# Patient Record
Sex: FEMALE | Race: WHITE | NOT HISPANIC OR LATINO | Employment: OTHER | URBAN - METROPOLITAN AREA
[De-identification: names, ages, dates, MRNs, and addresses within clinical notes are randomized per-mention and may not be internally consistent; named-entity substitution may affect disease eponyms.]

---

## 2017-01-11 ENCOUNTER — TRANSCRIBE ORDERS (OUTPATIENT)
Dept: LAB | Age: 52
End: 2017-01-11

## 2017-01-11 ENCOUNTER — APPOINTMENT (OUTPATIENT)
Dept: LAB | Age: 52
End: 2017-01-11
Payer: COMMERCIAL

## 2017-01-11 DIAGNOSIS — R00.2 PALPITATIONS: ICD-10-CM

## 2017-01-11 DIAGNOSIS — R73.9 HYPERGLYCEMIA: ICD-10-CM

## 2017-01-11 DIAGNOSIS — S06.0X9A CONCUSSION WITH LOSS OF CONSCIOUSNESS: ICD-10-CM

## 2017-01-11 DIAGNOSIS — G44.329 CHRONIC POST-TRAUMATIC HEADACHE, NOT INTRACTABLE: ICD-10-CM

## 2017-01-11 DIAGNOSIS — Z12.11 ENCOUNTER FOR SCREENING FOR MALIGNANT NEOPLASM OF COLON: ICD-10-CM

## 2017-01-11 DIAGNOSIS — F41.8 OTHER SPECIFIED ANXIETY DISORDERS: ICD-10-CM

## 2017-01-11 DIAGNOSIS — R26.2 DIFFICULTY IN WALKING, NOT ELSEWHERE CLASSIFIED: ICD-10-CM

## 2017-01-11 DIAGNOSIS — E78.5 HYPERLIPIDEMIA: ICD-10-CM

## 2017-01-11 DIAGNOSIS — R47.01 APHASIA: ICD-10-CM

## 2017-01-11 LAB
ALBUMIN SERPL BCP-MCNC: 4 G/DL (ref 3.5–5)
ALP SERPL-CCNC: 65 U/L (ref 46–116)
ALT SERPL W P-5'-P-CCNC: 34 U/L (ref 12–78)
ANION GAP SERPL CALCULATED.3IONS-SCNC: 7 MMOL/L (ref 4–13)
AST SERPL W P-5'-P-CCNC: 19 U/L (ref 5–45)
BACTERIA UR QL AUTO: NORMAL /HPF
BILIRUB SERPL-MCNC: 0.57 MG/DL (ref 0.2–1)
BILIRUB UR QL STRIP: NEGATIVE
BUN SERPL-MCNC: 11 MG/DL (ref 5–25)
CALCIUM SERPL-MCNC: 9.1 MG/DL (ref 8.3–10.1)
CHLORIDE SERPL-SCNC: 104 MMOL/L (ref 100–108)
CHOLEST SERPL-MCNC: 227 MG/DL (ref 50–200)
CLARITY UR: CLEAR
CO2 SERPL-SCNC: 28 MMOL/L (ref 21–32)
COLOR UR: YELLOW
CREAT SERPL-MCNC: 0.96 MG/DL (ref 0.6–1.3)
ERYTHROCYTE [DISTWIDTH] IN BLOOD BY AUTOMATED COUNT: 11.6 % (ref 11.6–15.1)
EST. AVERAGE GLUCOSE BLD GHB EST-MCNC: 100 MG/DL
GFR SERPL CREATININE-BSD FRML MDRD: >60 ML/MIN/1.73SQ M
GLUCOSE SERPL-MCNC: 105 MG/DL (ref 65–140)
GLUCOSE UR STRIP-MCNC: NEGATIVE MG/DL
HBA1C MFR BLD: 5.1 % (ref 4.2–6.3)
HCT VFR BLD AUTO: 43.9 % (ref 34.8–46.1)
HDLC SERPL-MCNC: 73 MG/DL (ref 40–60)
HGB BLD-MCNC: 14.7 G/DL (ref 11.5–15.4)
HGB UR QL STRIP.AUTO: ABNORMAL
KETONES UR STRIP-MCNC: NEGATIVE MG/DL
LDLC SERPL CALC-MCNC: 127 MG/DL (ref 0–100)
LEUKOCYTE ESTERASE UR QL STRIP: NEGATIVE
MCH RBC QN AUTO: 31.5 PG (ref 26.8–34.3)
MCHC RBC AUTO-ENTMCNC: 33.5 G/DL (ref 31.4–37.4)
MCV RBC AUTO: 94 FL (ref 82–98)
NITRITE UR QL STRIP: NEGATIVE
NON-SQ EPI CELLS URNS QL MICRO: NORMAL /HPF
PH UR STRIP.AUTO: 6 [PH] (ref 4.5–8)
PLATELET # BLD AUTO: 181 THOUSANDS/UL (ref 149–390)
PMV BLD AUTO: 10.7 FL (ref 8.9–12.7)
POTASSIUM SERPL-SCNC: 4.1 MMOL/L (ref 3.5–5.3)
PROT SERPL-MCNC: 7.8 G/DL (ref 6.4–8.2)
PROT UR STRIP-MCNC: NEGATIVE MG/DL
RBC # BLD AUTO: 4.66 MILLION/UL (ref 3.81–5.12)
RBC #/AREA URNS AUTO: NORMAL /HPF
SODIUM SERPL-SCNC: 139 MMOL/L (ref 136–145)
SP GR UR STRIP.AUTO: 1.01 (ref 1–1.03)
TRIGL SERPL-MCNC: 135 MG/DL
TSH SERPL DL<=0.05 MIU/L-ACNC: 1.07 UIU/ML (ref 0.36–3.74)
UROBILINOGEN UR QL STRIP.AUTO: 0.2 E.U./DL
WBC # BLD AUTO: 5.13 THOUSAND/UL (ref 4.31–10.16)
WBC #/AREA URNS AUTO: NORMAL /HPF

## 2017-01-11 PROCEDURE — 83036 HEMOGLOBIN GLYCOSYLATED A1C: CPT

## 2017-01-11 PROCEDURE — 84443 ASSAY THYROID STIM HORMONE: CPT

## 2017-01-11 PROCEDURE — 85027 COMPLETE CBC AUTOMATED: CPT

## 2017-01-11 PROCEDURE — 80053 COMPREHEN METABOLIC PANEL: CPT

## 2017-01-11 PROCEDURE — 80061 LIPID PANEL: CPT

## 2017-01-11 PROCEDURE — 81001 URINALYSIS AUTO W/SCOPE: CPT

## 2017-01-11 PROCEDURE — 36415 COLL VENOUS BLD VENIPUNCTURE: CPT

## 2017-01-17 ENCOUNTER — ALLSCRIPTS OFFICE VISIT (OUTPATIENT)
Dept: OTHER | Facility: OTHER | Age: 52
End: 2017-01-17

## 2017-05-31 ENCOUNTER — GENERIC CONVERSION - ENCOUNTER (OUTPATIENT)
Dept: OTHER | Facility: OTHER | Age: 52
End: 2017-05-31

## 2017-07-17 DIAGNOSIS — R26.2 DIFFICULTY IN WALKING, NOT ELSEWHERE CLASSIFIED: ICD-10-CM

## 2017-07-17 DIAGNOSIS — S06.0X9A CONCUSSION WITH LOSS OF CONSCIOUSNESS: ICD-10-CM

## 2017-07-17 DIAGNOSIS — R31.29 OTHER MICROSCOPIC HEMATURIA: ICD-10-CM

## 2017-07-17 DIAGNOSIS — E78.5 HYPERLIPIDEMIA: ICD-10-CM

## 2017-07-17 DIAGNOSIS — F41.8 OTHER SPECIFIED ANXIETY DISORDERS: ICD-10-CM

## 2017-07-17 DIAGNOSIS — G44.329 CHRONIC POST-TRAUMATIC HEADACHE, NOT INTRACTABLE: ICD-10-CM

## 2017-07-17 DIAGNOSIS — R73.9 HYPERGLYCEMIA: ICD-10-CM

## 2017-07-17 DIAGNOSIS — R47.01 APHASIA: ICD-10-CM

## 2017-07-17 DIAGNOSIS — Z12.11 ENCOUNTER FOR SCREENING FOR MALIGNANT NEOPLASM OF COLON: ICD-10-CM

## 2017-07-24 ENCOUNTER — APPOINTMENT (OUTPATIENT)
Dept: LAB | Facility: CLINIC | Age: 52
End: 2017-07-24
Payer: COMMERCIAL

## 2017-07-24 ENCOUNTER — TRANSCRIBE ORDERS (OUTPATIENT)
Dept: LAB | Facility: CLINIC | Age: 52
End: 2017-07-24

## 2017-07-24 DIAGNOSIS — R47.01 APHASIA: ICD-10-CM

## 2017-07-24 DIAGNOSIS — E78.5 HYPERLIPIDEMIA: ICD-10-CM

## 2017-07-24 DIAGNOSIS — R73.9 HYPERGLYCEMIA: ICD-10-CM

## 2017-07-24 DIAGNOSIS — R26.2 DIFFICULTY IN WALKING, NOT ELSEWHERE CLASSIFIED: ICD-10-CM

## 2017-07-24 DIAGNOSIS — Z12.11 ENCOUNTER FOR SCREENING FOR MALIGNANT NEOPLASM OF COLON: ICD-10-CM

## 2017-07-24 DIAGNOSIS — R31.29 OTHER MICROSCOPIC HEMATURIA: ICD-10-CM

## 2017-07-24 DIAGNOSIS — F41.8 OTHER SPECIFIED ANXIETY DISORDERS: ICD-10-CM

## 2017-07-24 DIAGNOSIS — S06.0X9A CONCUSSION WITH LOSS OF CONSCIOUSNESS: ICD-10-CM

## 2017-07-24 DIAGNOSIS — G44.329 CHRONIC POST-TRAUMATIC HEADACHE, NOT INTRACTABLE: ICD-10-CM

## 2017-07-24 LAB
ALBUMIN SERPL BCP-MCNC: 4 G/DL (ref 3.5–5)
ALP SERPL-CCNC: 65 U/L (ref 46–116)
ALT SERPL W P-5'-P-CCNC: 39 U/L (ref 12–78)
ANION GAP SERPL CALCULATED.3IONS-SCNC: 6 MMOL/L (ref 4–13)
AST SERPL W P-5'-P-CCNC: 23 U/L (ref 5–45)
BACTERIA UR QL AUTO: ABNORMAL /HPF
BILIRUB SERPL-MCNC: 0.53 MG/DL (ref 0.2–1)
BILIRUB UR QL STRIP: NEGATIVE
BUN SERPL-MCNC: 13 MG/DL (ref 5–25)
CALCIUM SERPL-MCNC: 9.2 MG/DL (ref 8.3–10.1)
CHLORIDE SERPL-SCNC: 104 MMOL/L (ref 100–108)
CHOLEST SERPL-MCNC: 232 MG/DL (ref 50–200)
CLARITY UR: CLEAR
CO2 SERPL-SCNC: 27 MMOL/L (ref 21–32)
COLOR UR: YELLOW
CREAT SERPL-MCNC: 0.95 MG/DL (ref 0.6–1.3)
ERYTHROCYTE [DISTWIDTH] IN BLOOD BY AUTOMATED COUNT: 11.7 % (ref 11.6–15.1)
EST. AVERAGE GLUCOSE BLD GHB EST-MCNC: 108 MG/DL
GFR SERPL CREATININE-BSD FRML MDRD: 69 ML/MIN/1.73SQ M
GLUCOSE P FAST SERPL-MCNC: 97 MG/DL (ref 65–99)
GLUCOSE UR STRIP-MCNC: NEGATIVE MG/DL
HBA1C MFR BLD: 5.4 % (ref 4.2–6.3)
HCT VFR BLD AUTO: 41.3 % (ref 34.8–46.1)
HDLC SERPL-MCNC: 59 MG/DL (ref 40–60)
HGB BLD-MCNC: 14 G/DL (ref 11.5–15.4)
HGB UR QL STRIP.AUTO: NEGATIVE
HYALINE CASTS #/AREA URNS LPF: ABNORMAL /LPF
KETONES UR STRIP-MCNC: NEGATIVE MG/DL
LDLC SERPL CALC-MCNC: 146 MG/DL (ref 0–100)
LEUKOCYTE ESTERASE UR QL STRIP: NEGATIVE
MCH RBC QN AUTO: 31.3 PG (ref 26.8–34.3)
MCHC RBC AUTO-ENTMCNC: 33.9 G/DL (ref 31.4–37.4)
MCV RBC AUTO: 92 FL (ref 82–98)
NITRITE UR QL STRIP: POSITIVE
NON-SQ EPI CELLS URNS QL MICRO: ABNORMAL /HPF
PH UR STRIP.AUTO: 7 [PH] (ref 4.5–8)
PLATELET # BLD AUTO: 146 THOUSANDS/UL (ref 149–390)
PMV BLD AUTO: 11.1 FL (ref 8.9–12.7)
POTASSIUM SERPL-SCNC: 3.8 MMOL/L (ref 3.5–5.3)
PROT SERPL-MCNC: 7.7 G/DL (ref 6.4–8.2)
PROT UR STRIP-MCNC: NEGATIVE MG/DL
RBC # BLD AUTO: 4.47 MILLION/UL (ref 3.81–5.12)
RBC #/AREA URNS AUTO: ABNORMAL /HPF
SODIUM SERPL-SCNC: 137 MMOL/L (ref 136–145)
SP GR UR STRIP.AUTO: 1.01 (ref 1–1.03)
TRIGL SERPL-MCNC: 135 MG/DL
TSH SERPL DL<=0.05 MIU/L-ACNC: 1.37 UIU/ML (ref 0.36–3.74)
UROBILINOGEN UR QL STRIP.AUTO: 0.2 E.U./DL
WBC # BLD AUTO: 5.54 THOUSAND/UL (ref 4.31–10.16)
WBC #/AREA URNS AUTO: ABNORMAL /HPF

## 2017-07-24 PROCEDURE — 80061 LIPID PANEL: CPT

## 2017-07-24 PROCEDURE — 81001 URINALYSIS AUTO W/SCOPE: CPT

## 2017-07-24 PROCEDURE — 36415 COLL VENOUS BLD VENIPUNCTURE: CPT

## 2017-07-24 PROCEDURE — 84443 ASSAY THYROID STIM HORMONE: CPT

## 2017-07-24 PROCEDURE — 85027 COMPLETE CBC AUTOMATED: CPT

## 2017-07-24 PROCEDURE — 80053 COMPREHEN METABOLIC PANEL: CPT

## 2017-07-24 PROCEDURE — 83036 HEMOGLOBIN GLYCOSYLATED A1C: CPT

## 2017-07-27 ENCOUNTER — ALLSCRIPTS OFFICE VISIT (OUTPATIENT)
Dept: OTHER | Facility: OTHER | Age: 52
End: 2017-07-27

## 2017-08-21 DIAGNOSIS — N39.0 URINARY TRACT INFECTION: ICD-10-CM

## 2018-01-11 NOTE — RESULT NOTES
Verified Results  (1) TISSUE EXAM [id number] 11:35AM Edy Clements     Test Name Result Flag Reference   LAB AP CASE REPORT (Report)     Surgical Pathology Report             Case: [id number]                   Authorizing Provider: Crystal Barr MD      Collected:      08/17/2016 1135        Ordering Location:   [facility]    Received:      08/18/2016 Rubén Arreguin U  8  Endoscopy                            Pathologist:      Bradley Russ MD                             Specimen:  Polyp, Colorectal, ascending colon polyp cold biopsy   LAB AP FINAL DIAGNOSIS      A  Ascending colon polyp:    - Colonic mucosa with a benign lymphoid aggregate, negative for   dysplasia and malignancy  Electronically signed by Bradley Russ MD on 8/20/2016 at 11:25 AM   LAB AP NOTE      Interpretation performed at [facility], [place], [facility]   LAB AP SURGICAL ADDITIONAL INFORMATION (Report)     These tests were developed and their performance characteristics   determined by [facility] or [facility]  They may not be cleared or approved by the [place]  Food and   Drug Administration  The FDA has determined that such clearance or   approval is not necessary  These tests are used for clinical purposes  They should not be regarded as investigational or for research  This   laboratory has been approved by [place], designated as a high-complexity   laboratory and is qualified to perform these tests  LAB AP GROSS DESCRIPTION (Report)     A  The specimen is received in formalin, labeled with the patient's name   and hospital number, and is designated ascending colon polyp  The   specimen consists of 3 tan soft tissue fragments measuring 0 2, 0 3 and   0 4 cm  Entirely submitted  One cassette      Note: The estimated total formalin fixation time based upon information   provided by the submitting clinician and the standard processing schedule   is 41 0 hours      MAC   LAB AP CLINICAL INFORMATION      Ascending colon polyp cold biopsy   Ascending colon polyp cold biopsy

## 2018-01-12 NOTE — RESULT NOTES
Verified Results  (1) LYME ANTIBODY, WESTERN BLOT 04Apr2016 10:02AM Hina Mcnamara Order Number: [id number]    Performed at:  [address] [place] [address]  [id number]  : Mary Carmen Roberson MD, Phone:  [phone]     Test Name Result Flag Reference   LYME 18 KD IGG Present A    LYME 23 KD IGG Present A    LYME 28 KD IGG Absent     LYME 30 KD IGG Present A    LYME 39 KD IGG Present A    LYME 41 KD IGG Absent     LYME 45 KD IGG Absent     LYME 58 KD IGG Present A    LYME 66 KD IGG Absent     LYME 93 KD IGG Absent     LYME 23 KD IGM Present A    LYME 39 KD IGM Absent     LYME 41 KD IGM Absent     LYME IGG WB INTERP  Positive A    Positive: 5 of the following                               Borrelia-specific bands:                               18,23,28,30,39,41,45,58,                               66, and 93  Negative: No bands or banding                               patterns which do not                               meet positive criteria  LYME IGM WB INTERP  Negative     Note: An equivocal or positive EIA result followed by a negativeWestern Blot result is considered NEGATIVE  An equivocal or positiveEIA result followed by a positive Western Blot is considered POSITIVEby the CDC  Positive: 2 of the following bands: 23,39 or 41Negative: No bands or banding patterns which do not meet positivecriteria  Criteria for positivity are those recommended by CDC/ASTPHLD p23=Osp C, c74=kdqacutbsFixu:Sera from individuals with the following may cross react in theLyme Western Blot assays: other spirochetal diseases (periodontaldisease, leptospirosis, relapsing fever, yaws, and pinta);connective autoimmune (Rheumatoid Arthritis and Systemic LupusErythematosus and also individuals with Antinuclear Antibody);other infections COFFEE [facility] Spotted Fever; Brian-Barr Virus,and Cytomegalovirus)

## 2018-01-13 VITALS
DIASTOLIC BLOOD PRESSURE: 78 MMHG | BODY MASS INDEX: 30.44 KG/M2 | RESPIRATION RATE: 16 BRPM | HEART RATE: 60 BPM | WEIGHT: 189.38 LBS | HEIGHT: 66 IN | SYSTOLIC BLOOD PRESSURE: 126 MMHG

## 2018-01-13 VITALS
WEIGHT: 190 LBS | SYSTOLIC BLOOD PRESSURE: 130 MMHG | DIASTOLIC BLOOD PRESSURE: 76 MMHG | BODY MASS INDEX: 30.67 KG/M2 | HEART RATE: 64 BPM

## 2018-01-16 NOTE — RESULT NOTES
Verified Results  (1) LYME PCR 12Apr2016 02:35PM Flako Pavon   Performed at:  [address], [address] [facility]  : Helen Cordon MD, Phone:  [phone]     Test Name Result Flag Reference   LYME PCR SP RES Negative  Negative   No B  burgdorferi DNA Detected  A negative PCR result for Borrelia burgdorferi on a blood sample doesnot eliminate the possibility of Lyme disease  CDC recommends thattwo-tiered serological testing in conjunction with clinical evaluationbe used as the primary method of diagnosis  This test was developed and its performance characteristics determinedby [facility]   It has not been cleared or approved by the Food and DrugAdministration  The FDA has determined that such clearance orapproval is not necessary

## 2018-01-27 DIAGNOSIS — Z00.00 ENCOUNTER FOR GENERAL ADULT MEDICAL EXAMINATION WITHOUT ABNORMAL FINDINGS: ICD-10-CM

## 2018-01-27 DIAGNOSIS — S06.0X9A CONCUSSION WITH LOSS OF CONSCIOUSNESS: ICD-10-CM

## 2018-01-27 DIAGNOSIS — E78.5 HYPERLIPIDEMIA: ICD-10-CM

## 2018-01-27 DIAGNOSIS — Z12.11 ENCOUNTER FOR SCREENING FOR MALIGNANT NEOPLASM OF COLON: ICD-10-CM

## 2018-01-27 DIAGNOSIS — R26.2 DIFFICULTY IN WALKING, NOT ELSEWHERE CLASSIFIED: ICD-10-CM

## 2018-01-27 DIAGNOSIS — F41.8 OTHER SPECIFIED ANXIETY DISORDERS: ICD-10-CM

## 2018-01-27 DIAGNOSIS — R73.9 HYPERGLYCEMIA: ICD-10-CM

## 2018-01-27 DIAGNOSIS — G81.91 HEMIPLEGIA AFFECTING RIGHT DOMINANT SIDE (HCC): ICD-10-CM

## 2018-01-27 DIAGNOSIS — R47.01 APHASIA: ICD-10-CM

## 2018-01-27 DIAGNOSIS — G44.329 CHRONIC POST-TRAUMATIC HEADACHE, NOT INTRACTABLE: ICD-10-CM

## 2018-01-27 DIAGNOSIS — N39.0 URINARY TRACT INFECTION: ICD-10-CM

## 2018-02-12 ENCOUNTER — OFFICE VISIT (OUTPATIENT)
Dept: FAMILY MEDICINE CLINIC | Facility: CLINIC | Age: 53
End: 2018-02-12
Payer: COMMERCIAL

## 2018-02-12 VITALS
DIASTOLIC BLOOD PRESSURE: 72 MMHG | HEIGHT: 65 IN | SYSTOLIC BLOOD PRESSURE: 108 MMHG | BODY MASS INDEX: 30.86 KG/M2 | WEIGHT: 185.2 LBS | TEMPERATURE: 98.6 F | HEART RATE: 72 BPM

## 2018-02-12 DIAGNOSIS — J40 BRONCHITIS: Primary | ICD-10-CM

## 2018-02-12 PROCEDURE — 99214 OFFICE O/P EST MOD 30 MIN: CPT | Performed by: PHYSICIAN ASSISTANT

## 2018-02-12 RX ORDER — DOXYCYCLINE 100 MG/1
100 TABLET ORAL 2 TIMES DAILY
Qty: 20 TABLET | Refills: 0 | Status: SHIPPED | OUTPATIENT
Start: 2018-02-12 | End: 2018-02-22

## 2018-02-12 RX ORDER — QUETIAPINE FUMARATE 50 MG/1
50 TABLET, FILM COATED ORAL DAILY PRN
COMMUNITY
Start: 2017-07-27

## 2018-02-12 NOTE — PROGRESS NOTES
Assessment/Plan:    Bronchitis  Antibiotic as directed  Increase fluids  Mucinex or Delsym as directed  Diagnoses and all orders for this visit:    Bronchitis  -     doxycycline (ADOXA) 100 MG tablet; Take 1 tablet (100 mg total) by mouth 2 (two) times a day for 10 days    Other orders  -     QUEtiapine (SEROQUEL) 50 mg tablet; Take by mouth          Subjective:   Cc: Pt c/o chest congestion, cough, sinus pressure and B/L ears blocked and feel as though there is water in the ear,fatigue, chills and body aches x 10 days also she states with this current illness her heart has been racing  kw     Patient ID: Alexis Pickett is a 47 y o  female  URI    This is a new problem  The current episode started 1 to 4 weeks ago  The problem has been gradually worsening  There has been no fever  Associated symptoms include congestion, coughing, rhinorrhea, sinus pain, a sore throat and wheezing  Treatments tried: Mucinex and Delsym  The treatment provided no relief  The following portions of the patient's history were reviewed and updated as appropriate: allergies, current medications, past family history, past medical history, past social history, past surgical history and problem list     Review of Systems   Constitutional: Positive for fatigue  HENT: Positive for congestion, nosebleeds, postnasal drip, rhinorrhea, sinus pain and sore throat  Eyes: Negative  Respiratory: Positive for cough and wheezing  Cardiovascular: Negative  Gastrointestinal: Negative  Endocrine: Negative  Genitourinary: Negative  Musculoskeletal: Negative  Skin: Negative  Allergic/Immunologic: Negative  Neurological: Negative  Hematological: Negative  Psychiatric/Behavioral: Negative            Objective:  Vitals:    02/12/18 1413   BP: 108/72   BP Location: Left arm   Pulse: 72   Temp: 98 6 °F (37 °C)   Weight: 84 kg (185 lb 3 2 oz)   Height: 5' 5" (1 651 m)       Vitals:    02/12/18 1413   BP: 108/72 Pulse: 72   Temp: 98 6 °F (37 °C)        Physical Exam   Constitutional: She is oriented to person, place, and time  She appears well-developed and well-nourished  No distress  HENT:   Head: Normocephalic and atraumatic  Eyes: Conjunctivae are normal  Right eye exhibits no discharge  Left eye exhibits no discharge  Neck: Neck supple  Carotid bruit is not present  Cardiovascular: Normal rate, regular rhythm and normal heart sounds  Exam reveals no gallop and no friction rub  No murmur heard  Pulmonary/Chest: Effort normal  No respiratory distress  She has wheezes  She has no rales  Mild rhonchi right posterior lung fields  Neurological: She is alert and oriented to person, place, and time  Skin: Skin is warm and dry  She is not diaphoretic  Psychiatric: She has a normal mood and affect  Judgment normal    Nursing note and vitals reviewed

## 2018-02-12 NOTE — PATIENT INSTRUCTIONS
Problem List Items Addressed This Visit     Bronchitis - Primary     Antibiotic as directed  Increase fluids  Mucinex or Delsym as directed            Relevant Medications    doxycycline (ADOXA) 100 MG tablet

## 2018-03-02 LAB
ALBUMIN SERPL-MCNC: 4.4 G/DL (ref 3.6–5.1)
ALBUMIN/GLOB SERPL: 1.8 (CALC) (ref 1–2.5)
ALP SERPL-CCNC: 88 U/L (ref 33–130)
ALT SERPL-CCNC: 24 U/L (ref 6–29)
APPEARANCE UR: CLEAR
AST SERPL-CCNC: 22 U/L (ref 10–35)
BACTERIA UR QL AUTO: ABNORMAL /HPF
BILIRUB SERPL-MCNC: 0.5 MG/DL (ref 0.2–1.2)
BILIRUB UR QL STRIP: NEGATIVE
BUN SERPL-MCNC: 15 MG/DL (ref 7–25)
BUN/CREAT SERPL: NORMAL (CALC) (ref 6–22)
CALCIUM SERPL-MCNC: 9.5 MG/DL (ref 8.6–10.4)
CHLORIDE SERPL-SCNC: 104 MMOL/L (ref 98–110)
CHOLEST SERPL-MCNC: 176 MG/DL
CHOLEST/HDLC SERPL: 2.8 (CALC)
CO2 SERPL-SCNC: 28 MMOL/L (ref 20–31)
COLOR UR: YELLOW
CREAT SERPL-MCNC: 0.9 MG/DL (ref 0.5–1.05)
ERYTHROCYTE [DISTWIDTH] IN BLOOD BY AUTOMATED COUNT: 12 % (ref 11–15)
GLOBULIN SER CALC-MCNC: 2.5 G/DL (CALC) (ref 1.9–3.7)
GLUCOSE SERPL-MCNC: 91 MG/DL (ref 65–99)
GLUCOSE UR QL STRIP: NEGATIVE
HBA1C MFR BLD: 5.3 % OF TOTAL HGB
HCT VFR BLD AUTO: 39.7 % (ref 35–45)
HDLC SERPL-MCNC: 62 MG/DL
HGB BLD-MCNC: 13.3 G/DL (ref 11.7–15.5)
HGB UR QL STRIP: ABNORMAL
HYALINE CASTS #/AREA URNS LPF: ABNORMAL /LPF
KETONES UR QL STRIP: NEGATIVE
LDLC SERPL CALC-MCNC: 91 MG/DL (CALC)
LEUKOCYTE ESTERASE UR QL STRIP: NEGATIVE
MCH RBC QN AUTO: 30.9 PG (ref 27–33)
MCHC RBC AUTO-ENTMCNC: 33.5 G/DL (ref 32–36)
MCV RBC AUTO: 92.3 FL (ref 80–100)
NITRITE UR QL STRIP: NEGATIVE
NONHDLC SERPL-MCNC: 114 MG/DL (CALC)
PH UR STRIP: ABNORMAL [PH] (ref 5–8)
PLATELET # BLD AUTO: 162 THOUSAND/UL (ref 140–400)
PMV BLD REES-ECKER: 10.6 FL (ref 7.5–12.5)
POTASSIUM SERPL-SCNC: 4.5 MMOL/L (ref 3.5–5.3)
PROT SERPL-MCNC: 6.9 G/DL (ref 6.1–8.1)
PROT UR QL STRIP: NEGATIVE
RBC # BLD AUTO: 4.3 MILLION/UL (ref 3.8–5.1)
RBC #/AREA URNS HPF: ABNORMAL /HPF
SL AMB EGFR AFRICAN AMERICAN: 85 ML/MIN/1.73M2
SL AMB EGFR NON AFRICAN AMERICAN: 73 ML/MIN/1.73M2
SODIUM SERPL-SCNC: 140 MMOL/L (ref 135–146)
SP GR UR STRIP: 1.02 (ref 1–1.03)
SQUAMOUS #/AREA URNS HPF: ABNORMAL /HPF
TRIGL SERPL-MCNC: 126 MG/DL
TSH SERPL-ACNC: 1.09 MIU/L
WBC # BLD AUTO: 4.8 THOUSAND/UL (ref 3.8–10.8)
WBC #/AREA URNS HPF: ABNORMAL /HPF

## 2018-03-03 PROBLEM — N39.3 STRESS INCONTINENCE IN FEMALE: Status: ACTIVE | Noted: 2017-09-14

## 2018-03-13 ENCOUNTER — OFFICE VISIT (OUTPATIENT)
Dept: FAMILY MEDICINE CLINIC | Facility: CLINIC | Age: 53
End: 2018-03-13
Payer: MEDICARE

## 2018-03-13 VITALS
BODY MASS INDEX: 31.65 KG/M2 | SYSTOLIC BLOOD PRESSURE: 124 MMHG | HEIGHT: 65 IN | WEIGHT: 190 LBS | RESPIRATION RATE: 16 BRPM | HEART RATE: 64 BPM | DIASTOLIC BLOOD PRESSURE: 70 MMHG

## 2018-03-13 DIAGNOSIS — R31.29 HEMATURIA, MICROSCOPIC: ICD-10-CM

## 2018-03-13 DIAGNOSIS — F07.81 POST CONCUSSION SYNDROME: ICD-10-CM

## 2018-03-13 DIAGNOSIS — E78.5 HYPERLIPIDEMIA, UNSPECIFIED HYPERLIPIDEMIA TYPE: ICD-10-CM

## 2018-03-13 DIAGNOSIS — R73.9 HYPERGLYCEMIA: ICD-10-CM

## 2018-03-13 DIAGNOSIS — S06.9X9S LATE EFFECT OF INTRACRANIAL INJURY (HCC): ICD-10-CM

## 2018-03-13 DIAGNOSIS — S29.019A THORACIC MYOFASCIAL STRAIN, INITIAL ENCOUNTER: Primary | ICD-10-CM

## 2018-03-13 DIAGNOSIS — F41.8 DEPRESSION WITH ANXIETY: ICD-10-CM

## 2018-03-13 DIAGNOSIS — G44.329 CHRONIC POST-TRAUMATIC HEADACHE, NOT INTRACTABLE: ICD-10-CM

## 2018-03-13 DIAGNOSIS — R26.2 AMBULATORY DYSFUNCTION: ICD-10-CM

## 2018-03-13 PROBLEM — J40 BRONCHITIS: Status: RESOLVED | Noted: 2018-02-12 | Resolved: 2018-03-13

## 2018-03-13 PROCEDURE — 99214 OFFICE O/P EST MOD 30 MIN: CPT | Performed by: FAMILY MEDICINE

## 2018-03-13 NOTE — PROGRESS NOTES
Assessment/Plan:  1  Thoracic strain, patient use ice and Tylenol if not fully relieved in 1-2 weeks return to office if worsen call office earlier  2  Hyperlipidemia, stable continue present therapy  3  Hyperglycemia diet controlled  4  Traumatic brain injury/post concussive syndrome/ambulation dysfunction at the present time patient is stable patient follows up with Neurology  5  Hematuria, microscopic this was evaluated Urology in the past   Benign will continue to monitor  6  Anxiety/depression, stable patient follows up with Psychiatry  7  Patient to return in 6 months for opposite blood work will see sooner if needed  Hematuria, microscopic  Chronic and benign was evaluated by Urology ultrasound in past was normal    Hyperlipidemia  Stable continue present therapy    Hyperglycemia  Diet controlled    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[facility])  Patient follows with neurologist    Depression with anxiety  Stable follow-up with Psychiatry    Chronic post-traumatic headache  Patient follows up with Neurology    Ambulatory dysfunction  Stable using cane    Post concussion syndrome  Follows with Neurology       Diagnoses and all orders for this visit:    Thoracic myofascial strain, initial encounter    Hematuria, microscopic  -     CBC; Future  -     Comprehensive metabolic panel; Future  -     Lipid Panel with Direct LDL reflex; Future  -     TSH, 3rd generation with T4 reflex; Future  -     Urinalysis with microscopic; Future  -     HEMOGLOBIN A1C W/ EAG ESTIMATION; Future    Hyperlipidemia, unspecified hyperlipidemia type  -     CBC; Future  -     Comprehensive metabolic panel; Future  -     Lipid Panel with Direct LDL reflex; Future  -     TSH, 3rd generation with T4 reflex; Future  -     Urinalysis with microscopic; Future  -     HEMOGLOBIN A1C W/ EAG ESTIMATION; Future    Hyperglycemia  -     CBC; Future  -     Comprehensive metabolic panel; Future  -     Lipid Panel with Direct LDL reflex;  Future  - TSH, 3rd generation with T4 reflex; Future  -     Urinalysis with microscopic; Future  -     HEMOGLOBIN A1C W/ EAG ESTIMATION; Future    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[address] )  -     CBC; Future  -     Comprehensive metabolic panel; Future  -     Lipid Panel with Direct LDL reflex; Future  -     TSH, 3rd generation with T4 reflex; Future  -     Urinalysis with microscopic; Future  -     HEMOGLOBIN A1C W/ EAG ESTIMATION; Future    Depression with anxiety  -     CBC; Future  -     Comprehensive metabolic panel; Future  -     Lipid Panel with Direct LDL reflex; Future  -     TSH, 3rd generation with T4 reflex; Future  -     Urinalysis with microscopic; Future  -     HEMOGLOBIN A1C W/ EAG ESTIMATION; Future    Chronic post-traumatic headache, not intractable  -     CBC; Future  -     Comprehensive metabolic panel; Future  -     Lipid Panel with Direct LDL reflex; Future  -     TSH, 3rd generation with T4 reflex; Future  -     Urinalysis with microscopic; Future  -     HEMOGLOBIN A1C W/ EAG ESTIMATION; Future    Ambulatory dysfunction  -     CBC; Future  -     Comprehensive metabolic panel; Future  -     Lipid Panel with Direct LDL reflex; Future  -     TSH, 3rd generation with T4 reflex; Future  -     Urinalysis with microscopic; Future  -     HEMOGLOBIN A1C W/ EAG ESTIMATION; Future    Post concussion syndrome  -     CBC; Future  -     Comprehensive metabolic panel; Future  -     Lipid Panel with Direct LDL reflex; Future  -     TSH, 3rd generation with T4 reflex; Future  -     Urinalysis with microscopic; Future  -     HEMOGLOBIN A1C W/ EAG ESTIMATION; Future          Subjective:   Cc: Follows up and to review blood work results  JUAN CARLOS Voss     Patient ID: Rosalinda Salvador is a 47 y o  female  Patient doing well  Patient had some thoracic pain status post a URI/bronchitis with severe coughing  Left side is completely resolved right side is almost fully resolved    At the present time no chest pain shortness of breath wheeze hemoptysis doing well  Patient does follow up with specialist and Neurology for her post concussive symptoms and psychiatry  The following portions of the patient's history were reviewed and updated as appropriate: allergies, current medications, past family history, past medical history, past social history, past surgical history and problem list     Review of Systems   Constitutional: Negative  HENT: Negative  Eyes: Negative  Respiratory: Negative  Cardiovascular: Negative  Gastrointestinal: Negative  Endocrine: Negative  Musculoskeletal:        HPI   Allergic/Immunologic: Negative  Neurological:        HPI   Hematological: Negative  Psychiatric/Behavioral:        HPI         Objective:      Vitals:    03/13/18 1117   BP: 124/70   BP Location: Left arm   Patient Position: Sitting   Cuff Size: Standard   Pulse: 64   Resp: 16   Weight: 86 2 kg (190 lb)   Height: 5' 5" (1 651 m)          Physical Exam   Constitutional: She is oriented to person, place, and time  She appears well-developed and well-nourished  HENT:   Head: Normocephalic and atraumatic  Mouth/Throat: Oropharynx is clear and moist    Eyes: Conjunctivae are normal    Neck: Neck supple  Cardiovascular: Regular rhythm and normal heart sounds  Pulmonary/Chest: Breath sounds normal    Abdominal: Soft  Bowel sounds are normal    Musculoskeletal:   Ambulating with cane, mild paravertebral muscle contraction thoracic right more so than left area negative gross deformity negative point tenderness   Lymphadenopathy:     She has no cervical adenopathy  Neurological: She is alert and oriented to person, place, and time  Skin: Skin is warm and dry  Psychiatric: She has a normal mood and affect

## 2018-09-19 DIAGNOSIS — E78.5 HYPERLIPIDEMIA, UNSPECIFIED HYPERLIPIDEMIA TYPE: Primary | ICD-10-CM

## 2018-09-19 RX ORDER — PRAVASTATIN SODIUM 40 MG
TABLET ORAL
Qty: 90 TABLET | Refills: 0 | Status: SHIPPED | OUTPATIENT
Start: 2018-09-19 | End: 2018-09-21 | Stop reason: SDUPTHER

## 2018-09-21 DIAGNOSIS — E78.5 HYPERLIPIDEMIA, UNSPECIFIED HYPERLIPIDEMIA TYPE: ICD-10-CM

## 2018-09-21 RX ORDER — PRAVASTATIN SODIUM 80 MG/1
80 TABLET ORAL DAILY
Qty: 90 TABLET | Refills: 0 | Status: SHIPPED | OUTPATIENT
Start: 2018-09-21 | End: 2019-02-18 | Stop reason: SDUPTHER

## 2018-10-08 ENCOUNTER — APPOINTMENT (OUTPATIENT)
Dept: LAB | Facility: MEDICAL CENTER | Age: 53
End: 2018-10-08
Payer: MEDICARE

## 2018-10-08 DIAGNOSIS — F07.81 POST CONCUSSION SYNDROME: ICD-10-CM

## 2018-10-08 DIAGNOSIS — G44.329 CHRONIC POST-TRAUMATIC HEADACHE, NOT INTRACTABLE: ICD-10-CM

## 2018-10-08 DIAGNOSIS — E78.5 HYPERLIPIDEMIA, UNSPECIFIED HYPERLIPIDEMIA TYPE: ICD-10-CM

## 2018-10-08 DIAGNOSIS — R26.2 AMBULATORY DYSFUNCTION: ICD-10-CM

## 2018-10-08 DIAGNOSIS — R73.9 HYPERGLYCEMIA: ICD-10-CM

## 2018-10-08 DIAGNOSIS — S06.9X9S LATE EFFECT OF INTRACRANIAL INJURY (HCC): ICD-10-CM

## 2018-10-08 DIAGNOSIS — R31.29 HEMATURIA, MICROSCOPIC: ICD-10-CM

## 2018-10-08 DIAGNOSIS — F41.8 DEPRESSION WITH ANXIETY: ICD-10-CM

## 2018-10-08 LAB
ALBUMIN SERPL BCP-MCNC: 3.5 G/DL (ref 3.5–5)
ALP SERPL-CCNC: 67 U/L (ref 46–116)
ALT SERPL W P-5'-P-CCNC: 77 U/L (ref 12–78)
ANION GAP SERPL CALCULATED.3IONS-SCNC: 7 MMOL/L (ref 4–13)
AST SERPL W P-5'-P-CCNC: 42 U/L (ref 5–45)
BACTERIA UR QL AUTO: ABNORMAL /HPF
BILIRUB SERPL-MCNC: 0.73 MG/DL (ref 0.2–1)
BILIRUB UR QL STRIP: NEGATIVE
BUN SERPL-MCNC: 17 MG/DL (ref 5–25)
CALCIUM SERPL-MCNC: 9 MG/DL (ref 8.3–10.1)
CHLORIDE SERPL-SCNC: 106 MMOL/L (ref 100–108)
CHOLEST SERPL-MCNC: 169 MG/DL (ref 50–200)
CLARITY UR: CLEAR
CO2 SERPL-SCNC: 24 MMOL/L (ref 21–32)
COLOR UR: YELLOW
CREAT SERPL-MCNC: 0.67 MG/DL (ref 0.6–1.3)
ERYTHROCYTE [DISTWIDTH] IN BLOOD BY AUTOMATED COUNT: 10.8 % (ref 11.6–15.1)
EST. AVERAGE GLUCOSE BLD GHB EST-MCNC: 108 MG/DL
GFR SERPL CREATININE-BSD FRML MDRD: 100 ML/MIN/1.73SQ M
GLUCOSE P FAST SERPL-MCNC: 96 MG/DL (ref 65–99)
GLUCOSE UR STRIP-MCNC: NEGATIVE MG/DL
HBA1C MFR BLD: 5.4 % (ref 4.2–6.3)
HCT VFR BLD AUTO: 40.7 % (ref 34.8–46.1)
HDLC SERPL-MCNC: 52 MG/DL (ref 40–60)
HGB BLD-MCNC: 13.5 G/DL (ref 11.5–15.4)
HGB UR QL STRIP.AUTO: ABNORMAL
HYALINE CASTS #/AREA URNS LPF: ABNORMAL /LPF
KETONES UR STRIP-MCNC: NEGATIVE MG/DL
LDLC SERPL CALC-MCNC: 83 MG/DL (ref 0–100)
LEUKOCYTE ESTERASE UR QL STRIP: NEGATIVE
MCH RBC QN AUTO: 30.9 PG (ref 26.8–34.3)
MCHC RBC AUTO-ENTMCNC: 33.2 G/DL (ref 31.4–37.4)
MCV RBC AUTO: 93 FL (ref 82–98)
NITRITE UR QL STRIP: NEGATIVE
NON-SQ EPI CELLS URNS QL MICRO: ABNORMAL /HPF
PH UR STRIP.AUTO: 6 [PH] (ref 4.5–8)
PLATELET # BLD AUTO: 155 THOUSANDS/UL (ref 149–390)
PMV BLD AUTO: 10.9 FL (ref 8.9–12.7)
POTASSIUM SERPL-SCNC: 3.8 MMOL/L (ref 3.5–5.3)
PROT SERPL-MCNC: 7.1 G/DL (ref 6.4–8.2)
PROT UR STRIP-MCNC: NEGATIVE MG/DL
RBC # BLD AUTO: 4.37 MILLION/UL (ref 3.81–5.12)
RBC #/AREA URNS AUTO: ABNORMAL /HPF
SODIUM SERPL-SCNC: 137 MMOL/L (ref 136–145)
SP GR UR STRIP.AUTO: 1.02 (ref 1–1.03)
T4 FREE SERPL-MCNC: 1.74 NG/DL (ref 0.76–1.46)
TRIGL SERPL-MCNC: 170 MG/DL
TSH SERPL DL<=0.05 MIU/L-ACNC: <0.007 UIU/ML (ref 0.36–3.74)
UROBILINOGEN UR QL STRIP.AUTO: 0.2 E.U./DL
WBC # BLD AUTO: 4.07 THOUSAND/UL (ref 4.31–10.16)
WBC #/AREA URNS AUTO: ABNORMAL /HPF

## 2018-10-08 PROCEDURE — 36415 COLL VENOUS BLD VENIPUNCTURE: CPT

## 2018-10-08 PROCEDURE — 84443 ASSAY THYROID STIM HORMONE: CPT

## 2018-10-08 PROCEDURE — 81001 URINALYSIS AUTO W/SCOPE: CPT

## 2018-10-08 PROCEDURE — 85027 COMPLETE CBC AUTOMATED: CPT

## 2018-10-08 PROCEDURE — 80061 LIPID PANEL: CPT

## 2018-10-08 PROCEDURE — 84439 ASSAY OF FREE THYROXINE: CPT

## 2018-10-08 PROCEDURE — 83036 HEMOGLOBIN GLYCOSYLATED A1C: CPT

## 2018-10-08 PROCEDURE — 80053 COMPREHEN METABOLIC PANEL: CPT

## 2018-10-17 RX ORDER — TEMAZEPAM 7.5 MG/1
CAPSULE ORAL
Refills: 0 | COMMUNITY
Start: 2018-07-26 | End: 2018-12-05 | Stop reason: ALTCHOICE

## 2018-10-18 ENCOUNTER — APPOINTMENT (OUTPATIENT)
Dept: LAB | Facility: CLINIC | Age: 53
End: 2018-10-18
Payer: MEDICARE

## 2018-10-18 ENCOUNTER — OFFICE VISIT (OUTPATIENT)
Dept: FAMILY MEDICINE CLINIC | Facility: CLINIC | Age: 53
End: 2018-10-18
Payer: MEDICARE

## 2018-10-18 VITALS
DIASTOLIC BLOOD PRESSURE: 80 MMHG | HEART RATE: 64 BPM | WEIGHT: 184.2 LBS | BODY MASS INDEX: 30.69 KG/M2 | SYSTOLIC BLOOD PRESSURE: 122 MMHG | HEIGHT: 65 IN

## 2018-10-18 DIAGNOSIS — E78.5 HYPERLIPIDEMIA, UNSPECIFIED HYPERLIPIDEMIA TYPE: ICD-10-CM

## 2018-10-18 DIAGNOSIS — Z00.00 HEALTH CARE MAINTENANCE: ICD-10-CM

## 2018-10-18 DIAGNOSIS — F41.8 DEPRESSION WITH ANXIETY: ICD-10-CM

## 2018-10-18 DIAGNOSIS — F07.81 POST CONCUSSION SYNDROME: ICD-10-CM

## 2018-10-18 DIAGNOSIS — E05.90 HYPERTHYROIDISM: Primary | ICD-10-CM

## 2018-10-18 DIAGNOSIS — R73.9 HYPERGLYCEMIA: ICD-10-CM

## 2018-10-18 DIAGNOSIS — Z23 NEED FOR INFLUENZA VACCINATION: ICD-10-CM

## 2018-10-18 DIAGNOSIS — R31.29 HEMATURIA, MICROSCOPIC: ICD-10-CM

## 2018-10-18 DIAGNOSIS — G81.91 HEMIPARESIS, RIGHT (HCC): ICD-10-CM

## 2018-10-18 DIAGNOSIS — D72.819 LEUKOPENIA, UNSPECIFIED TYPE: ICD-10-CM

## 2018-10-18 DIAGNOSIS — R26.2 AMBULATORY DYSFUNCTION: ICD-10-CM

## 2018-10-18 DIAGNOSIS — D72.819 LEUKOPENIA, UNSPECIFIED TYPE: Primary | ICD-10-CM

## 2018-10-18 DIAGNOSIS — S06.9X0D TRAUMATIC BRAIN INJURY, WITHOUT LOSS OF CONSCIOUSNESS, SUBSEQUENT ENCOUNTER: ICD-10-CM

## 2018-10-18 DIAGNOSIS — E05.90 HYPERTHYROIDISM: ICD-10-CM

## 2018-10-18 LAB
ERYTHROCYTE [DISTWIDTH] IN BLOOD BY AUTOMATED COUNT: 10.8 % (ref 11.6–15.1)
HCT VFR BLD AUTO: 40.8 % (ref 34.8–46.1)
HGB BLD-MCNC: 13.6 G/DL (ref 11.5–15.4)
MCH RBC QN AUTO: 30.9 PG (ref 26.8–34.3)
MCHC RBC AUTO-ENTMCNC: 33.3 G/DL (ref 31.4–37.4)
MCV RBC AUTO: 93 FL (ref 82–98)
PLATELET # BLD AUTO: 165 THOUSANDS/UL (ref 149–390)
PMV BLD AUTO: 10.8 FL (ref 8.9–12.7)
RBC # BLD AUTO: 4.4 MILLION/UL (ref 3.81–5.12)
T3 SERPL-MCNC: 2 NG/ML (ref 0.6–1.8)
T4 FREE SERPL-MCNC: 1.46 NG/DL (ref 0.76–1.46)
T4 SERPL-MCNC: 13.1 UG/DL (ref 4.7–13.3)
TSH SERPL DL<=0.05 MIU/L-ACNC: <0.007 UIU/ML (ref 0.36–3.74)
WBC # BLD AUTO: 3.93 THOUSAND/UL (ref 4.31–10.16)

## 2018-10-18 PROCEDURE — 85027 COMPLETE CBC AUTOMATED: CPT

## 2018-10-18 PROCEDURE — 86376 MICROSOMAL ANTIBODY EACH: CPT

## 2018-10-18 PROCEDURE — 86800 THYROGLOBULIN ANTIBODY: CPT

## 2018-10-18 PROCEDURE — 90471 IMMUNIZATION ADMIN: CPT

## 2018-10-18 PROCEDURE — 84480 ASSAY TRIIODOTHYRONINE (T3): CPT

## 2018-10-18 PROCEDURE — 99214 OFFICE O/P EST MOD 30 MIN: CPT | Performed by: FAMILY MEDICINE

## 2018-10-18 PROCEDURE — 90682 RIV4 VACC RECOMBINANT DNA IM: CPT

## 2018-10-18 PROCEDURE — 36415 COLL VENOUS BLD VENIPUNCTURE: CPT

## 2018-10-18 PROCEDURE — 84443 ASSAY THYROID STIM HORMONE: CPT

## 2018-10-18 PROCEDURE — 84439 ASSAY OF FREE THYROXINE: CPT

## 2018-10-18 NOTE — PROGRESS NOTES
Assessment/Plan:  1  Hyperthyroidism, repeat T4, T3, TSH, antithyroid antibodies today  Check thyroid ultrasound refer to Endocrinology  2  Hyperlipidemia, stable continue present therapy  3  Hyperglycemia stable continue present therapy  4  Traumatic brain injury/right hemiparesis/ambulation dysfunction/postconcussion syndrome, patient was with cane follows with Neurology  5  Leukopenia very mild repeat CBC  6  Depression/anxiety, stable continue present therapy patient follows up with Psychiatry  7  Health care maintenance, flu vaccine given today  8  Formal follow-up in 6 months for office visit blood work, sooner depending on workup of above  Health care maintenance  Flu vaccine given today    Hyperthyroidism  Repeat T4, T3, TSH, antithyroid antibodies, check ultrasound of thyroid, refer to Endocrinology    Ambulatory dysfunction  Using cane follows with Neurology    Traumatic brain injury [facility])  Follows with Neurology    Post concussion syndrome  Follows with Neurology    Hemiparesis, right ([facility] [address] )  Follows with Neurology    Hematuria, microscopic  Benign per workup with Urology    Hyperlipidemia  Stable continue present therapy    Hyperglycemia  Diet controlled    Depression with anxiety  Stable continue present therapy       Diagnoses and all orders for this visit:    Hyperthyroidism  -     CBC  -     T4; Future  -     TSH, 3rd generation with Free T4 reflex  -     T3; Future  -     Thyroid Antibodies Panel; Future  -     US thyroid; Future  -     Ambulatory referral to Endocrinology;  Future  -     CBC  -     Comprehensive metabolic panel  -     Hemoglobin A1C; Future  -     Lipid Panel with Direct LDL reflex  -     T4; Future  -     TSH, 3rd generation with Free T4 reflex  -     Urinalysis with microscopic    Need for influenza vaccination  -     influenza vaccine, [id number], quadrivalent, recombinant, PF, 0 5 mL, for patients 18 yr+ ([id number])    Traumatic brain injury, without loss of consciousness, subsequent encounter  -     CBC  -     Comprehensive metabolic panel  -     Hemoglobin A1C; Future  -     Lipid Panel with Direct LDL reflex  -     T4; Future  -     TSH, 3rd generation with Free T4 reflex  -     Urinalysis with microscopic    Hemiparesis, right (HCC)  -     CBC  -     Comprehensive metabolic panel  -     Hemoglobin A1C; Future  -     Lipid Panel with Direct LDL reflex  -     T4; Future  -     TSH, 3rd generation with Free T4 reflex  -     Urinalysis with microscopic    Post concussion syndrome  -     CBC  -     Comprehensive metabolic panel  -     Hemoglobin A1C; Future  -     Lipid Panel with Direct LDL reflex  -     T4; Future  -     TSH, 3rd generation with Free T4 reflex  -     Urinalysis with microscopic    Hematuria, microscopic  -     CBC  -     Comprehensive metabolic panel  -     Hemoglobin A1C; Future  -     Lipid Panel with Direct LDL reflex  -     T4; Future  -     TSH, 3rd generation with Free T4 reflex  -     Urinalysis with microscopic    Hyperlipidemia, unspecified hyperlipidemia type  -     CBC  -     Comprehensive metabolic panel  -     Hemoglobin A1C; Future  -     Lipid Panel with Direct LDL reflex  -     T4; Future  -     TSH, 3rd generation with Free T4 reflex  -     Urinalysis with microscopic    Hyperglycemia  -     CBC  -     Comprehensive metabolic panel  -     Hemoglobin A1C; Future  -     Lipid Panel with Direct LDL reflex  -     T4; Future  -     TSH, 3rd generation with Free T4 reflex  -     Urinalysis with microscopic    Depression with anxiety  -     CBC  -     Comprehensive metabolic panel  -     Hemoglobin A1C; Future  -     Lipid Panel with Direct LDL reflex  -     T4; Future  -     TSH, 3rd generation with Free T4 reflex  -     Urinalysis with microscopic    Ambulatory dysfunction  -     CBC  -     Comprehensive metabolic panel  -     Hemoglobin A1C; Future  -     Lipid Panel with Direct LDL reflex  -     T4; Future  -     TSH, 3rd generation with Free T4 reflex  -     Urinalysis with microscopic    Health care maintenance  -     CBC  -     Comprehensive metabolic panel  -     Hemoglobin A1C; Future  -     Lipid Panel with Direct LDL reflex  -     T4; Future  -     TSH, 3rd generation with Free T4 reflex  -     Urinalysis with microscopic    Leukopenia, unspecified type  -     CBC  -     CBC  -     Comprehensive metabolic panel  -     Hemoglobin A1C; Future  -     Lipid Panel with Direct LDL reflex  -     T4; Future  -     TSH, 3rd generation with Free T4 reflex  -     Urinalysis with microscopic          Subjective: pt jasmin ere for follow up of hyperlipidemia and to review blood work~cd     Patient ID: Bakari Plaza is a 46 y o  female  A discussed blood work with the patient  Patient does have chronic hematuria was worked up by Urology in the past   Patient is hyperthyroid by T4 and TSH  Patient denies any thyroid problems in the past   No family history of thyroid disorders  Patient did lose 6 lb since last office visit  Patient does feel her heart beat at night in bed  Does not feel it is racing or palpitations just has noticed it more than usual   No chest pain shortness of breath nausea vomiting palpitations or diaphoresis  The following portions of the patient's history were reviewed and updated as appropriate: allergies, current medications, past family history, past medical history, past social history, past surgical history and problem list     Review of Systems   Constitutional:        HPI   HENT: Negative  Eyes: Negative  Respiratory: Negative  Cardiovascular:        HPI   Gastrointestinal: Negative  Endocrine:        HPI   Genitourinary:        HPI   Musculoskeletal: Negative  Skin: Negative  Allergic/Immunologic: Negative  Neurological: Negative  Hematological: Negative  Psychiatric/Behavioral: Negative            Objective:  Vitals:    10/18/18 1003   BP: 122/80   BP Location: Left arm   Patient Position: Sitting   Cuff Size: Large   Pulse: 64   Weight: 83 6 kg (184 lb 3 2 oz)   Height: 5' 5" (1 651 m)                Physical Exam   Constitutional: She is oriented to person, place, and time  She appears well-developed and well-nourished  HENT:   Head: Normocephalic and atraumatic  Mouth/Throat: Oropharynx is clear and moist    Neck: Neck supple  No thyromegaly present  Cardiovascular: Normal rate, regular rhythm and normal heart sounds  Pulmonary/Chest: Effort normal and breath sounds normal    Abdominal: Soft  Bowel sounds are normal  There is no tenderness  Musculoskeletal: She exhibits no edema  Ambulates with cane   Lymphadenopathy:     She has no cervical adenopathy  Neurological: She is alert and oriented to person, place, and time  Skin: Skin is warm and dry  Psychiatric: She has a normal mood and affect

## 2018-10-18 NOTE — PATIENT INSTRUCTIONS
Complete repeat thyroid and blood count blood work  Complete thyroid ultrasound follow up with Endocrinology for overactive thyroid    Return in 6 months for office visit blood work sooner if needed

## 2018-10-19 ENCOUNTER — OFFICE VISIT (OUTPATIENT)
Dept: ENDOCRINOLOGY | Facility: CLINIC | Age: 53
End: 2018-10-19
Payer: MEDICARE

## 2018-10-19 ENCOUNTER — HOSPITAL ENCOUNTER (OUTPATIENT)
Dept: RADIOLOGY | Facility: MEDICAL CENTER | Age: 53
Discharge: HOME | End: 2018-10-19
Payer: MEDICARE

## 2018-10-19 VITALS
DIASTOLIC BLOOD PRESSURE: 86 MMHG | HEIGHT: 65 IN | BODY MASS INDEX: 30.69 KG/M2 | HEART RATE: 75 BPM | SYSTOLIC BLOOD PRESSURE: 122 MMHG | WEIGHT: 184.2 LBS

## 2018-10-19 DIAGNOSIS — E05.90 HYPERTHYROIDISM: ICD-10-CM

## 2018-10-19 DIAGNOSIS — E06.3 HASHIMOTO'S THYROIDITIS: Primary | ICD-10-CM

## 2018-10-19 LAB
THYROGLOB AB SERPL-ACNC: 2.3 IU/ML (ref 0–0.9)
THYROPEROXIDASE AB SERPL-ACNC: 69 IU/ML (ref 0–34)

## 2018-10-19 PROCEDURE — 99204 OFFICE O/P NEW MOD 45 MIN: CPT | Performed by: INTERNAL MEDICINE

## 2018-10-19 PROCEDURE — 76536 US EXAM OF HEAD AND NECK: CPT

## 2018-10-19 RX ORDER — MULTIVITAMIN WITH IRON
TABLET ORAL
COMMUNITY
End: 2019-04-13

## 2018-10-19 NOTE — LETTER
October 19, 2018     Fabian Garcia   [address]  [place] [address] [place]    Patient: Barbara Ignacio   YOB: 1965   Date of Visit: 10/19/2018       Dear Dr Wolff Serve: Thank you for referring Osorio Escalante to me for evaluation  Below are my notes for this consultation  If you have questions, please do not hesitate to call me  I look forward to following your patient along with you  Sincerely,        Shannon Gonzalez MD        CC: No Recipients  Shannon Gonzalez MD  10/19/2018  4:56 PM  Sign at close encounter   Barbara Ignacio 46 y o  female MRN: [id number]    Encounter: [id number]      Assessment/Plan     Problem List Items Addressed This Visit     Hyperthyroidism    Relevant Orders    TSH, 3rd generation Lab Collect    T4, free Lab Collect    T3 Lab Collect    Hashimoto's thyroiditis - Primary     hyperthyroidism sec to hashimoto thyroiditis - free t4 has normalized already will continue to monitor and repeat thyroid function tests in 2 weeks - she may become hypothyroid over time   Relevant Orders    TSH, 3rd generation Lab Collect    T4, free Lab Collect    T3 Lab Collect        CC:   Hyperthyroidism     History of Present Illness      HPI: 45 y/o woman referred here for evaluation of hyperthyroidism - she had labs done during routine testing earlier this month   She has had Weight loss- 5-6 lbs over the past month -  C/o fatigue - chronic  constipation - chronic  Sleep issues - chronic  palpitations off and on for the past month or so  SOB - on exertion  No tremors   Heat intolerance    No neck pain     Review of Systems   Constitutional: Positive for fatigue and unexpected weight change  Eyes: Negative for visual disturbance  Respiratory: Negative for cough and shortness of breath  Cardiovascular: Positive for palpitations  Negative for leg swelling  Gastrointestinal: Positive for constipation  Negative for diarrhea, nausea and vomiting     Endocrine: Negative for polydipsia and polyuria  Skin: Negative for color change, pallor, rash and wound  Psychiatric/Behavioral: Positive for sleep disturbance  All other systems reviewed and are negative  Historical Information   Past Medical History:   Diagnosis Date    Anxiety     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)     Depression     Hyperglycemia     Hyperlipidemia     Lyme disease 2016    Palpitations     Post concussion syndrome      Past Surgical History:   Procedure Laterality Date    BACK SURGERY      lamenectomy l4 and l5     SECTION      ENDOMETRIAL ABLATION      TX COLONOSCOPY FLX DX W/COLLJ SPEC WHEN PFRMD N/A 2016    Procedure: COLONOSCOPY;  Surgeon: Stephenie Almanzar MD;  Location: [facility]; Service: Gastroenterology    TX COLONOSCOPY FLX DX W/COLLJ SPEC WHEN PFRMD N/A 2016    Procedure: COLONOSCOPY;  Surgeon: Stephenie Almanzar MD;  Location: [facility]; Service: Gastroenterology     Social History   History   Alcohol Use    Yes     Comment: socially     History   Drug Use No     History   Smoking Status    Never Smoker   Smokeless Tobacco    Never Used     Comment: stopped yrs ago / former smoker per [employer]     Family History:   Family History   Problem Relation Age of Onset    Hypertension Mother     Stroke Mother         vertebral artery    Coronary artery disease Father     Diabetes Father        Meds/Allergies   Current Outpatient Prescriptions   Medication Sig Dispense Refill    b complex vitamins tablet Take 1 tablet by mouth daily   escitalopram (LEXAPRO) 20 mg tablet Take 20 mg by mouth daily   fluticasone (VERAMYST) 27 5 MCG/SPRAY nasal spray 2 sprays into each nostril daily   glucosamine-chondroitin 500-400 MG tablet Take 1 tablet by mouth 3 (three) times a day   Magnesium 250 MG TABS Take by mouth      montelukast (SINGULAIR) 10 mg tablet Take 10 mg by mouth daily at bedtime        multivitamin (THERAGRAN) TABS Take 1 tablet by mouth daily   Omega-3 Fatty Acids (FISH OIL PO) Take by mouth   pravastatin (PRAVACHOL) 80 mg tablet Take 1 tablet (80 mg total) by mouth daily 90 tablet 0    QUEtiapine (SEROQUEL) 50 mg tablet Take by mouth      temazepam (RESTORIL) 7 5 mg capsule TAKE ONE CAPSULE BY MOUTH AT NIGHT AS NEEDED FOR SLEEP  0    onabotulinumtoxin A (BOTOX) 100 units by Infiltration route once   SUMAtriptan Succinate 6 MG/0 5ML SOAJ Inject under the skin every 12 (twelve) hours as needed  No current facility-administered medications for this visit  Allergies   Allergen Reactions    Keflex [Cephalexin] Hives     Other reaction(s): Uticaria/Hives    Paroxetine Drowsiness       Objective   Vitals: Blood pressure 122/86, pulse 75, height 5' 5" (1 651 m), weight 83 6 kg (184 lb 3 2 oz)  Physical Exam   Constitutional: She is oriented to person, place, and time  She appears well-developed and well-nourished  No distress  HENT:   Head: Normocephalic and atraumatic  Mouth/Throat: No oropharyngeal exudate  Eyes: Conjunctivae and EOM are normal  No scleral icterus  Neck: Normal range of motion  Neck supple  No thyromegaly present  Cardiovascular: Normal rate, regular rhythm and normal heart sounds  No murmur heard  Pulmonary/Chest: Effort normal and breath sounds normal  No respiratory distress  She has no wheezes  She has no rales  Abdominal: Soft  Bowel sounds are normal  She exhibits no distension  There is no tenderness  There is no rebound  Musculoskeletal: Normal range of motion  She exhibits no edema or deformity  Neurological: She is alert and oriented to person, place, and time  Skin: Skin is warm and dry  No rash noted  No erythema  No pallor  Psychiatric: She has a normal mood and affect  Her behavior is normal  Judgment and thought content normal        The history was obtained from the review of the chart, patient and family      Lab Results:   Lab Results Component Value Date/Time    TSH 3RD GENERATON <0 007 (L) 10/18/2018 10:53 AM    TSH 3RD GENERATON <0 007 (L) 10/08/2018 08:44 AM    Free T4 1 46 10/18/2018 10:53 AM    Free T4 1 74 (H) 10/08/2018 08:44 AM    Thyroglobulin Ab 2 3 (H) 10/18/2018 10:53 AM                Portions of the record may have been created with voice recognition software  Occasional wrong word or "sound a like" substitutions may have occurred due to the inherent limitations of voice recognition software  Read the chart carefully and recognize, using context, where substitutions have occurred

## 2018-10-19 NOTE — LETTER
2018     [address]  [place] [address] [employer]    Patient: Xuan Crisostomo   YOB: 1965   Date of Visit: 10/19/2018       Dear Dr Gearold Lundborg: Thank you for referring Clifford Zarco to me for evaluation  Below are my notes for this consultation  If you have questions, please do not hesitate to call me  I look forward to following your patient along with you  Sincerely,        Shaneka Sexton MD        CC: No Recipients  Shaneka Sexton MD  10/19/2018 10:30 AM  Sign at close encounter   Xuan Crisostomo 46 y o  female MRN: [id number]    Encounter: [id number]      Assessment/Plan     Problem List Items Addressed This Visit     Hyperthyroidism        CC:   ***    History of Present Illness      HPI:  Weight loss- 5-6 lbs over the past month -  C/o fatigue - chronic  constipation - chronic  Sleep issues - chronic  palpitations off and on for the past month or so  SOB - on exertion  No tremors   Heat intolerance    No neck pain     Review of Systems    Historical Information   Past Medical History:   Diagnosis Date    Anxiety     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)     Depression     Hyperglycemia     Hyperlipidemia     Lyme disease 2016    Palpitations     Post concussion syndrome      Past Surgical History:   Procedure Laterality Date    BACK SURGERY      lamenectomy l4 and l5     SECTION      ENDOMETRIAL ABLATION      MI COLONOSCOPY FLX DX W/COLLJ SPEC WHEN PFRMD N/A 2016    Procedure: COLONOSCOPY;  Surgeon: Javier Centeno MD;  Location: [facility]; Service: Gastroenterology    MI COLONOSCOPY FLX DX W/COLLJ SPEC WHEN PFRMD N/A 2016    Procedure: COLONOSCOPY;  Surgeon: Javier Centeno MD;  Location: [facility];   Service: Gastroenterology     Social History   History   Alcohol Use    Yes     Comment: socially     History   Drug Use No     History   Smoking Status    Never Smoker   Smokeless Tobacco    Never Used     Comment: stopped yrs ago / former smoker per [employer]     Family History:   Family History   Problem Relation Age of Onset    Hypertension Mother     Stroke Mother         vertebral artery    Coronary artery disease Father     Diabetes Father        Meds/Allergies   Current Outpatient Prescriptions   Medication Sig Dispense Refill    b complex vitamins tablet Take 1 tablet by mouth daily   escitalopram (LEXAPRO) 20 mg tablet Take 20 mg by mouth daily   fluticasone (VERAMYST) 27 5 MCG/SPRAY nasal spray 2 sprays into each nostril daily   glucosamine-chondroitin 500-400 MG tablet Take 1 tablet by mouth 3 (three) times a day   Magnesium 250 MG TABS Take by mouth      montelukast (SINGULAIR) 10 mg tablet Take 10 mg by mouth daily at bedtime   multivitamin (THERAGRAN) TABS Take 1 tablet by mouth daily   Omega-3 Fatty Acids (FISH OIL PO) Take by mouth   pravastatin (PRAVACHOL) 80 mg tablet Take 1 tablet (80 mg total) by mouth daily 90 tablet 0    QUEtiapine (SEROQUEL) 50 mg tablet Take by mouth      temazepam (RESTORIL) 7 5 mg capsule TAKE ONE CAPSULE BY MOUTH AT NIGHT AS NEEDED FOR SLEEP  0    onabotulinumtoxin A (BOTOX) 100 units by Infiltration route once   SUMAtriptan Succinate 6 MG/0 5ML SOAJ Inject under the skin every 12 (twelve) hours as needed  No current facility-administered medications for this visit  Allergies   Allergen Reactions    Keflex [Cephalexin] Hives     Other reaction(s): Uticaria/Hives    Paroxetine Drowsiness       Objective   Vitals: Blood pressure 122/86, pulse 75, height 5' 5" (1 651 m), weight 83 6 kg (184 lb 3 2 oz)  Physical Exam    The history was obtained from the review of the chart, patient and family      Lab Results:   Lab Results   Component Value Date/Time    TSH 3RD GENERATON <0 007 (L) 10/18/2018 10:53 AM    TSH 3RD GENERATON <0 007 (L) 10/08/2018 08:44 AM    Free T4 1 46 10/18/2018 10:53 AM    Free T4 1 74 (H) 10/08/2018 08:44 AM    Thyroglobulin Ab 2 3 (H) 10/18/2018 10:53 AM     Imaging Studies:          {Results Review Statement:94302}    Portions of the record may have been created with voice recognition software  Occasional wrong word or "sound a like" substitutions may have occurred due to the inherent limitations of voice recognition software  Read the chart carefully and recognize, using context, where substitutions have occurred

## 2018-10-19 NOTE — ASSESSMENT & PLAN NOTE
hyperthyroidism sec to hashimoto thyroiditis - free t4 has normalized already will continue to monitor and repeat thyroid function tests in 2 weeks - she may become hypothyroid over time

## 2018-10-19 NOTE — PROGRESS NOTES
Bridgette Garcia 46 y o  female MRN: [id number]    Encounter: [id number]      Assessment/Plan     Problem List Items Addressed This Visit     Hyperthyroidism    Relevant Orders    TSH, 3rd generation Lab Collect    T4, free Lab Collect    T3 Lab Collect    Hashimoto's thyroiditis - Primary     hyperthyroidism sec to hashimoto thyroiditis - free t4 has normalized already will continue to monitor and repeat thyroid function tests in 2 weeks - she may become hypothyroid over time   Relevant Orders    TSH, 3rd generation Lab Collect    T4, free Lab Collect    T3 Lab Collect        CC:   Hyperthyroidism     History of Present Illness      HPI: 45 y/o woman referred here for evaluation of hyperthyroidism - she had labs done during routine testing earlier this month   She has had Weight loss- 5-6 lbs over the past month -  C/o fatigue - chronic  constipation - chronic  Sleep issues - chronic  palpitations off and on for the past month or so  SOB - on exertion  No tremors   Heat intolerance    No neck pain     Review of Systems   Constitutional: Positive for fatigue and unexpected weight change  Eyes: Negative for visual disturbance  Respiratory: Negative for cough and shortness of breath  Cardiovascular: Positive for palpitations  Negative for leg swelling  Gastrointestinal: Positive for constipation  Negative for diarrhea, nausea and vomiting  Endocrine: Negative for polydipsia and polyuria  Skin: Negative for color change, pallor, rash and wound  Psychiatric/Behavioral: Positive for sleep disturbance  All other systems reviewed and are negative        Historical Information   Past Medical History:   Diagnosis Date    Anxiety     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)     Depression     Hyperglycemia     Hyperlipidemia     Lyme disease 04/04/2016    Palpitations     Post concussion syndrome      Past Surgical History:   Procedure Laterality Date    BACK SURGERY lamenectomy l4 and l5     SECTION      ENDOMETRIAL ABLATION      ID COLONOSCOPY FLX DX W/COLLJ SPEC WHEN PFRMD N/A 2016    Procedure: COLONOSCOPY;  Surgeon: Lara Estes MD;  Location: [facility]; Service: Gastroenterology    ID COLONOSCOPY FLX DX W/COLLJ SPEC WHEN PFRMD N/A 2016    Procedure: COLONOSCOPY;  Surgeon: Lara Estes MD;  Location: [facility]; Service: Gastroenterology     Social History   History   Alcohol Use    Yes     Comment: socially     History   Drug Use No     History   Smoking Status    Never Smoker   Smokeless Tobacco    Never Used     Comment: stopped yrs ago / former smoker per [employer]     Family History:   Family History   Problem Relation Age of Onset    Hypertension Mother     Stroke Mother         vertebral artery    Coronary artery disease Father     Diabetes Father        Meds/Allergies   Current Outpatient Prescriptions   Medication Sig Dispense Refill    b complex vitamins tablet Take 1 tablet by mouth daily   escitalopram (LEXAPRO) 20 mg tablet Take 20 mg by mouth daily   fluticasone (VERAMYST) 27 5 MCG/SPRAY nasal spray 2 sprays into each nostril daily   glucosamine-chondroitin 500-400 MG tablet Take 1 tablet by mouth 3 (three) times a day   Magnesium 250 MG TABS Take by mouth      montelukast (SINGULAIR) 10 mg tablet Take 10 mg by mouth daily at bedtime   multivitamin (THERAGRAN) TABS Take 1 tablet by mouth daily   Omega-3 Fatty Acids (FISH OIL PO) Take by mouth   pravastatin (PRAVACHOL) 80 mg tablet Take 1 tablet (80 mg total) by mouth daily 90 tablet 0    QUEtiapine (SEROQUEL) 50 mg tablet Take by mouth      temazepam (RESTORIL) 7 5 mg capsule TAKE ONE CAPSULE BY MOUTH AT NIGHT AS NEEDED FOR SLEEP  0    onabotulinumtoxin A (BOTOX) 100 units by Infiltration route once   SUMAtriptan Succinate 6 MG/0 5ML SOAJ Inject under the skin every 12 (twelve) hours as needed         No current facility-administered medications for this visit  Allergies   Allergen Reactions    Keflex [Cephalexin] Hives     Other reaction(s): Uticaria/Hives    Paroxetine Drowsiness       Objective   Vitals: Blood pressure 122/86, pulse 75, height 5' 5" (1 651 m), weight 83 6 kg (184 lb 3 2 oz)  Physical Exam   Constitutional: She is oriented to person, place, and time  She appears well-developed and well-nourished  No distress  HENT:   Head: Normocephalic and atraumatic  Mouth/Throat: No oropharyngeal exudate  Eyes: Conjunctivae and EOM are normal  No scleral icterus  Neck: Normal range of motion  Neck supple  No thyromegaly present  Cardiovascular: Normal rate, regular rhythm and normal heart sounds  No murmur heard  Pulmonary/Chest: Effort normal and breath sounds normal  No respiratory distress  She has no wheezes  She has no rales  Abdominal: Soft  Bowel sounds are normal  She exhibits no distension  There is no tenderness  There is no rebound  Musculoskeletal: Normal range of motion  She exhibits no edema or deformity  Neurological: She is alert and oriented to person, place, and time  Skin: Skin is warm and dry  No rash noted  No erythema  No pallor  Psychiatric: She has a normal mood and affect  Her behavior is normal  Judgment and thought content normal        The history was obtained from the review of the chart, patient and family  Lab Results:   Lab Results   Component Value Date/Time    TSH 3RD GENERATON <0 007 (L) 10/18/2018 10:53 AM    TSH 3RD GENERATON <0 007 (L) 10/08/2018 08:44 AM    Free T4 1 46 10/18/2018 10:53 AM    Free T4 1 74 (H) 10/08/2018 08:44 AM    Thyroglobulin Ab 2 3 (H) 10/18/2018 10:53 AM                Portions of the record may have been created with voice recognition software  Occasional wrong word or "sound a like" substitutions may have occurred due to the inherent limitations of voice recognition software   Read the chart carefully and recognize, using context, where substitutions have occurred

## 2018-11-13 ENCOUNTER — LAB (OUTPATIENT)
Dept: LAB | Facility: MEDICAL CENTER | Age: 53
End: 2018-11-13
Payer: MEDICARE

## 2018-11-13 DIAGNOSIS — E05.90 HYPERTHYROIDISM: ICD-10-CM

## 2018-11-13 DIAGNOSIS — E06.3 HASHIMOTO'S THYROIDITIS: ICD-10-CM

## 2018-11-13 LAB
T3 SERPL-MCNC: 1.7 NG/ML (ref 0.6–1.8)
T4 FREE SERPL-MCNC: 1.51 NG/DL (ref 0.76–1.46)
TSH SERPL DL<=0.05 MIU/L-ACNC: <0.007 UIU/ML (ref 0.36–3.74)

## 2018-11-13 PROCEDURE — 36415 COLL VENOUS BLD VENIPUNCTURE: CPT

## 2018-11-13 PROCEDURE — 84480 ASSAY TRIIODOTHYRONINE (T3): CPT

## 2018-11-13 PROCEDURE — 84439 ASSAY OF FREE THYROXINE: CPT

## 2018-11-13 PROCEDURE — 84443 ASSAY THYROID STIM HORMONE: CPT

## 2018-11-19 ENCOUNTER — TELEPHONE (OUTPATIENT)
Dept: ENDOCRINOLOGY | Facility: CLINIC | Age: 53
End: 2018-11-19

## 2018-11-19 DIAGNOSIS — E05.90 HYPERTHYROIDISM: Primary | ICD-10-CM

## 2018-11-19 NOTE — TELEPHONE ENCOUNTER
----- Message from Joaquim Roldan MD sent at 11/18/2018  8:23 PM EST -----  Please call the patient regarding labs - free t4 had normalized and is high again - repeat labs prior to next visit , tsh, free t4 and TSI

## 2018-11-19 NOTE — PROGRESS NOTES
Please call the patient regarding labs - free t4 had normalized and is high again - repeat labs prior to next visit , tsh, free t4 and TSI

## 2018-11-26 ENCOUNTER — LAB (OUTPATIENT)
Dept: LAB | Age: 53
End: 2018-11-26
Payer: MEDICARE

## 2018-11-26 DIAGNOSIS — E05.90 HYPERTHYROIDISM: ICD-10-CM

## 2018-11-26 LAB
T4 FREE SERPL-MCNC: 1.12 NG/DL (ref 0.76–1.46)
TSH SERPL DL<=0.05 MIU/L-ACNC: <0.007 UIU/ML (ref 0.36–3.74)

## 2018-11-26 PROCEDURE — 84443 ASSAY THYROID STIM HORMONE: CPT

## 2018-11-26 PROCEDURE — 84439 ASSAY OF FREE THYROXINE: CPT

## 2018-11-26 PROCEDURE — 84445 ASSAY OF TSI GLOBULIN: CPT

## 2018-11-26 PROCEDURE — 36415 COLL VENOUS BLD VENIPUNCTURE: CPT

## 2018-11-27 LAB — TSI SER-ACNC: 1.57 IU/L (ref 0–0.55)

## 2018-11-28 ENCOUNTER — TELEPHONE (OUTPATIENT)
Dept: ENDOCRINOLOGY | Facility: CLINIC | Age: 53
End: 2018-11-28

## 2018-11-28 NOTE — PROGRESS NOTES
Please call the patient regarding labs - overactive thyroid secondary to Graves disease-will discuss medication on upcoming visit

## 2018-11-28 NOTE — TELEPHONE ENCOUNTER
----- Message from Carissa Rowe MD sent at 11/28/2018  8:33 AM EST -----  Please call the patient regarding labs - overactive thyroid secondary to Graves disease-will discuss medication on upcoming visit

## 2018-12-05 ENCOUNTER — OFFICE VISIT (OUTPATIENT)
Dept: ENDOCRINOLOGY | Facility: CLINIC | Age: 53
End: 2018-12-05
Payer: MEDICARE

## 2018-12-05 VITALS
HEIGHT: 66 IN | DIASTOLIC BLOOD PRESSURE: 70 MMHG | SYSTOLIC BLOOD PRESSURE: 140 MMHG | HEART RATE: 68 BPM | WEIGHT: 181 LBS | BODY MASS INDEX: 29.09 KG/M2

## 2018-12-05 DIAGNOSIS — E05.90 HYPERTHYROIDISM: Primary | ICD-10-CM

## 2018-12-05 DIAGNOSIS — E06.3 HASHIMOTO'S THYROIDITIS: ICD-10-CM

## 2018-12-05 DIAGNOSIS — D72.819 LEUKOPENIA, UNSPECIFIED TYPE: ICD-10-CM

## 2018-12-05 PROCEDURE — 99214 OFFICE O/P EST MOD 30 MIN: CPT | Performed by: PHYSICIAN ASSISTANT

## 2018-12-05 RX ORDER — MULTIVIT-MIN/IRON/FOLIC ACID/K 18-600-40
1 CAPSULE ORAL DAILY
COMMUNITY

## 2018-12-05 NOTE — PROGRESS NOTES
Established Patient Progress Note       Chief Complaint   Patient presents with    Hyperthyroidism        History of Present Illness: Fran Ortega is a 46 y o  female with a history of thyroid dysfunction  She has had a suppressed TSH since 10/8/2018  Thyroid function normal in 2017  She has had Free T4 level which has been high, then normal, then high, then normal again  She has had some symptoms including anxiety which is chronic, but more than in the past   She has occasional palpitations at night which seems to have improved  She has not had any tremors   Constipation was a problem in the past but now BMs have been occurring on a more regular basis  She has lost a few pounds  (weight 2/2018 185, 190 march 2018, today 181)    She has had + antibody testing associated with both graves disease and Hashimotos thyroiditis  Ultrasound 10/19/2018--no nodules, mildly heterogenous and hypervascular gland       Patient Active Problem List   Diagnosis    Allergic rhinitis    Ambulatory dysfunction    Anxiety state    Aphasia with TBI (traumatic brain injury), closed    Cervical disc disease    Chronic post-traumatic headache    Concussion    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    Depression with anxiety    Fatigue    Generalized anxiety disorder    Hematuria, microscopic    Hemiparesis, right (HCC)    Hyperglycemia    Hyperlipidemia    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[address] )    Menopausal and postmenopausal disorder    Neck pain    Neck sprain and strain    Palpitations    Post concussion syndrome    Post-concussion headache    Speech abnormality    Stress incontinence in female    Traumatic brain injury ([address]    Health care maintenance    Hyperthyroidism    Leukopenia    Hashimoto's thyroiditis      Past Medical History:   Diagnosis Date    Anxiety     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)     Depression     Hyperglycemia     Hyperlipidemia     Lyme disease 2016    Palpitations     Post concussion syndrome       Past Surgical History:   Procedure Laterality Date    BACK SURGERY      lamenectomy l4 and l5     SECTION      ENDOMETRIAL ABLATION      MS COLONOSCOPY FLX DX W/COLLJ SPEC WHEN PFRMD N/A 2016    Procedure: COLONOSCOPY;  Surgeon: Alexandra Gray MD;  Location: [facility]; Service: Gastroenterology    MS COLONOSCOPY FLX DX W/COLLJ SPEC WHEN PFRMD N/A 2016    Procedure: COLONOSCOPY;  Surgeon: Alexandra Gray MD;  Location: [facility]; Service: Gastroenterology      Family History   Problem Relation Age of Onset    Hypertension Mother     Stroke Mother         vertebral artery    Coronary artery disease Father     Diabetes Father      Social History   Substance Use Topics    Smoking status: Never Smoker    Smokeless tobacco: Never Used      Comment: stopped yrs ago / former smoker per [employer]    Alcohol use Yes      Comment: socially     Allergies   Allergen Reactions    Keflex [Cephalexin] Hives     Other reaction(s): Uticaria/Hives    Paroxetine Drowsiness       Current Outpatient Prescriptions:     b complex vitamins tablet, Take 1 tablet by mouth daily  , Disp: , Rfl:     Cholecalciferol (VITAMIN D) 2000 units CAPS, Take 1 capsule by mouth daily, Disp: , Rfl:     escitalopram (LEXAPRO) 20 mg tablet, Take 20 mg by mouth daily  , Disp: , Rfl:     fluticasone (VERAMYST) 27 5 MCG/SPRAY nasal spray, 2 sprays into each nostril daily  , Disp: , Rfl:     glucosamine-chondroitin 500-400 MG tablet, Take 1 tablet by mouth 3 (three) times a day , Disp: , Rfl:     Magnesium 250 MG TABS, Take by mouth, Disp: , Rfl:     montelukast (SINGULAIR) 10 mg tablet, Take 10 mg by mouth daily at bedtime  , Disp: , Rfl:     multivitamin (THERAGRAN) TABS, Take 1 tablet by mouth daily  , Disp: , Rfl:     Omega-3 Fatty Acids (FISH OIL PO), Take by mouth , Disp: , Rfl:     pravastatin (PRAVACHOL) 80 mg tablet, Take 1 tablet (80 mg total) by mouth daily, Disp: 90 tablet, Rfl: 0    QUEtiapine (SEROQUEL) 50 mg tablet, Take by mouth, Disp: , Rfl:     SUMAtriptan Succinate 6 MG/0 5ML SOAJ, Inject under the skin every 12 (twelve) hours as needed  , Disp: , Rfl:     Review of Systems   Constitutional: Positive for fatigue  Negative for activity change, appetite change, chills, diaphoresis, fever and unexpected weight change  HENT: Negative for trouble swallowing and voice change  Eyes: Negative for visual disturbance  Respiratory: Negative for shortness of breath  Cardiovascular: Positive for palpitations  Negative for chest pain  Gastrointestinal: Negative for abdominal pain, constipation and diarrhea  Endocrine: Negative for cold intolerance, heat intolerance, polydipsia, polyphagia and polyuria  Genitourinary: Negative for frequency and menstrual problem  Musculoskeletal: Negative for arthralgias and myalgias  Skin: Negative for rash  Allergic/Immunologic: Negative for food allergies  Neurological: Negative for dizziness and tremors  Hematological: Negative for adenopathy  Psychiatric/Behavioral: Negative for sleep disturbance  The patient is nervous/anxious  All other systems reviewed and are negative  Physical Exam:  Body mass index is 29 21 kg/m²  /70   Pulse 68   Ht 5' 6" (1 676 m)   Wt 82 1 kg (181 lb)   BMI 29 21 kg/m²    Wt Readings from Last 3 Encounters:   12/05/18 82 1 kg (181 lb)   10/19/18 83 6 kg (184 lb 3 2 oz)   10/18/18 83 6 kg (184 lb 3 2 oz)       Physical Exam   Constitutional: She is oriented to person, place, and time  She appears well-developed and well-nourished  No distress  HENT:   Head: Normocephalic and atraumatic  Eyes: Pupils are equal, round, and reactive to light  Conjunctivae are normal    Neck: Normal range of motion  Neck supple  No thyromegaly present  Cardiovascular: Normal rate, regular rhythm and normal heart sounds      Pulmonary/Chest: Effort normal and breath sounds normal  No respiratory distress  She has no wheezes  She has no rales  Abdominal: Soft  Bowel sounds are normal  She exhibits no distension  There is no tenderness  Musculoskeletal: Normal range of motion  She exhibits no edema  Neurological: She is alert and oriented to person, place, and time  Skin: Skin is warm and dry  Psychiatric: She has a normal mood and affect  Vitals reviewed        Labs:   Component      Latest Ref Rng & Units 10/8/2018 10/18/2018 11/13/2018   Sodium      136 - 145 mmol/L 137     Potassium      3 5 - 5 3 mmol/L 3 8     Chloride      100 - 108 mmol/L 106     CO2      21 - 32 mmol/L 24     Anion Gap      4 - 13 mmol/L 7     BUN      5 - 25 mg/dL 17     Creatinine      0 60 - 1 30 mg/dL 0 67     GLUCOSE FASTING      65 - 99 mg/dL 96     Calcium      8 3 - 10 1 mg/dL 9 0     AST      5 - 45 U/L 42     ALT      12 - 78 U/L 77     Alkaline Phosphatase      46 - 116 U/L 67     Total Protein      6 4 - 8 2 g/dL 7 1     Albumin      3 5 - 5 0 g/dL 3 5     TOTAL BILIRUBIN      0 20 - 1 00 mg/dL 0 73     eGFR      ml/min/1 73sq m 100     WBC      4 31 - 10 16 Thousand/uL 4 07 (L) 3 93 (L)    Red Blood Cell Count      3 81 - 5 12 Million/uL 4 37 4 40    Hemoglobin      11 5 - 15 4 g/dL 13 5 13 6    HCT      34 8 - 46 1 % 40 7 40 8    MCV      82 - 98 fL 93 93    MCH      26 8 - 34 3 pg 30 9 30 9    MCHC      31 4 - 37 4 g/dL 33 2 33 3    RDW      11 6 - 15 1 % 10 8 (L) 10 8 (L)    Platelet Count      503 - 390 Thousands/uL 155 165    MPV      8 9 - 12 7 fL 10 9 10 8    TSH 3RD GENERATON      0 358 - 3 740 uIU/mL <0 007 (L) <0 007 (L) <0 007 (L)   Free T4      0 76 - 1 46 ng/dL 1 74 (H) 1 46 1 51 (H)   T4 TOTAL      4 7 - 13 3 ug/dL  13 1    T3, Total      0 60 - 1 80 ng/mL  2 00 (H) 1 70   THYROID MICROSOMAL ANTIBODY      0 - 34 IU/mL  69 (H)    THYROGLOBULIN AB      0 0 - 0 9 IU/mL  2 3 (H)    THYROID STIMULATING IMMUNOGLOBULIN      0 00 - 0 55 IU/L        Component      Latest Ref Rng & Units 11/26/2018   Sodium      136 - 145 mmol/L    Potassium      3 5 - 5 3 mmol/L    Chloride      100 - 108 mmol/L    CO2      21 - 32 mmol/L    Anion Gap      4 - 13 mmol/L    BUN      5 - 25 mg/dL    Creatinine      0 60 - 1 30 mg/dL    GLUCOSE FASTING      65 - 99 mg/dL    Calcium      8 3 - 10 1 mg/dL    AST      5 - 45 U/L    ALT      12 - 78 U/L    Alkaline Phosphatase      46 - 116 U/L    Total Protein      6 4 - 8 2 g/dL    Albumin      3 5 - 5 0 g/dL    TOTAL BILIRUBIN      0 20 - 1 00 mg/dL    eGFR      ml/min/1 73sq m    WBC      4 31 - 10 16 Thousand/uL    Red Blood Cell Count      3 81 - 5 12 Million/uL    Hemoglobin      11 5 - 15 4 g/dL    HCT      34 8 - 46 1 %    MCV      82 - 98 fL    MCH      26 8 - 34 3 pg    MCHC      31 4 - 37 4 g/dL    RDW      11 6 - 15 1 %    Platelet Count      257 - 390 Thousands/uL    MPV      8 9 - 12 7 fL    TSH 3RD GENERATON      0 358 - 3 740 uIU/mL <0 007 (L)   Free T4      0 76 - 1 46 ng/dL 1 12   T4 TOTAL      4 7 - 13 3 ug/dL    T3, Total      0 60 - 1 80 ng/mL    THYROID MICROSOMAL ANTIBODY      0 - 34 IU/mL    THYROGLOBULIN AB      0 0 - 0 9 IU/mL    THYROID STIMULATING IMMUNOGLOBULIN      0 00 - 0 55 IU/L 1 57 (H)     Impression & Plan:    Problem List Items Addressed This Visit     Hyperthyroidism - Primary     Lab testing + For graves though she also has + Antibodies for hashimototos  She seems to be improving symptomatically and feeling well overall  Recent lab testing showed decreased in Free T4 level from 1 57 to 1  12  Discussed option of treatment vs monitoring  Will repeat testing in 3 weeks including CBC with diff and consider treatment at that time  Reviewed risks/side effects of methimazole so she is aware if treatment needs to be started before next visit  Reviewed risks of untreated hyperthyroidism            Relevant Orders    CBC and differential    TSH, 3rd generation    T4, free    T3 Lab Collect    Leukopenia     Repeat CBC with diff prior to starting methimazole  Hashimoto's thyroiditis     Will monitor  Orders Placed This Encounter   Procedures    CBC and differential     This is a patient instruction: This test is non-fasting  Please drink two glasses of water morning of bloodwork  Standing Status:   Future     Standing Expiration Date:   12/5/2019    TSH, 3rd generation     This is a patient instruction: This test is non-fasting  Please drink two glasses of water morning of bloodwork  Standing Status:   Future     Standing Expiration Date:   12/5/2019    T4, free     Standing Status:   Future     Standing Expiration Date:   12/5/2019    T3 Lab Collect     Standing Status:   Future     Standing Expiration Date:   12/5/2019       There are no Patient Instructions on file for this visit  Discussed with the patient and all questioned fully answered  She will call me if any problems arise  Follow-up appointment in 3-4 months       Counseled patient on diagnostic results, prognosis, risk and benefit of treatment options, instruction for management, importance of treatment compliance, Risk  factor reduction and impressions      Adrián Dumont PA-C

## 2018-12-07 NOTE — ASSESSMENT & PLAN NOTE
Lab testing + For graves though she also has + Antibodies for hashimototos  She seems to be improving symptomatically and feeling well overall  Recent lab testing showed decreased in Free T4 level from 1 57 to 1  12  Discussed option of treatment vs monitoring  Will repeat testing in 3 weeks including CBC with diff and consider treatment at that time  Reviewed risks/side effects of methimazole so she is aware if treatment needs to be started before next visit  Reviewed risks of untreated hyperthyroidism

## 2018-12-17 ENCOUNTER — TELEPHONE (OUTPATIENT)
Dept: ENDOCRINOLOGY | Facility: CLINIC | Age: 53
End: 2018-12-17

## 2018-12-17 DIAGNOSIS — E06.3 HASHIMOTO'S THYROIDITIS: Primary | ICD-10-CM

## 2018-12-17 DIAGNOSIS — E05.90 HYPERTHYROIDISM: ICD-10-CM

## 2018-12-17 NOTE — TELEPHONE ENCOUNTER
Please call patient  Sorry for delay  I reviewed with Dr Yenny Pimentel and in addition to the repeat labs, Will plan to do Nuclear medicine Uptake/Scan to determine if thyroid is currently overactive or underactive and this will help to decide if we need to treat with meds (methimazole) or monitor with labs only  I'll place order     Thanks

## 2019-02-18 DIAGNOSIS — E78.5 HYPERLIPIDEMIA, UNSPECIFIED HYPERLIPIDEMIA TYPE: ICD-10-CM

## 2019-02-18 RX ORDER — PRAVASTATIN SODIUM 80 MG/1
80 TABLET ORAL DAILY
Qty: 90 TABLET | Refills: 2 | Status: SHIPPED | OUTPATIENT
Start: 2019-02-18

## 2019-03-07 ENCOUNTER — HOSPITAL ENCOUNTER (EMERGENCY)
Facility: HOSPITAL | Age: 54
Discharge: HOME | End: 2019-03-07
Attending: EMERGENCY MEDICINE
Payer: MEDICARE

## 2019-03-07 ENCOUNTER — APPOINTMENT (EMERGENCY)
Dept: RADIOLOGY | Facility: HOSPITAL | Age: 54
End: 2019-03-07
Payer: MEDICARE

## 2019-03-07 VITALS
BODY MASS INDEX: 31.88 KG/M2 | DIASTOLIC BLOOD PRESSURE: 81 MMHG | OXYGEN SATURATION: 98 % | HEIGHT: 64 IN | RESPIRATION RATE: 16 BRPM | TEMPERATURE: 98.7 F | HEART RATE: 68 BPM | WEIGHT: 186.73 LBS | SYSTOLIC BLOOD PRESSURE: 148 MMHG

## 2019-03-07 DIAGNOSIS — S83.91XA RIGHT KNEE SPRAIN: Primary | ICD-10-CM

## 2019-03-07 PROCEDURE — 99283 EMERGENCY DEPT VISIT LOW MDM: CPT

## 2019-03-07 PROCEDURE — 73564 X-RAY EXAM KNEE 4 OR MORE: CPT

## 2019-03-07 RX ORDER — NAPROXEN 250 MG/1
500 TABLET ORAL ONCE
Status: COMPLETED | OUTPATIENT
Start: 2019-03-07 | End: 2019-03-07

## 2019-03-07 RX ADMIN — NAPROXEN 500 MG: 250 TABLET ORAL at 02:01

## 2019-03-07 NOTE — ED PROVIDER NOTES
Pt Name: Rosalinda Salvador  MRN: [id number]  YOB: 1965  Age/Sex: 46 y o  female  Date of evaluation: 3/7/2019  PCP: Mary Jane Sauceda, [address] [place] [address]    Chief Complaint   Patient presents with    Knee Injury     Pt states she slipped and fell on the ice yesterday  Pt now c/o right knee pain  Pt states she took advil approx 1 hour ago but denies relief  HPI    Mariana Door presents to the Emergency Department complaining of R knee pain  69-year-old female presents due to right knee pain  Patient reports falling yesterday evening while exiting truck stepping on ice, and falling to her buttock  Patient reports that she stepped and twisted, felt her knee give out, immediate sharp pain  Patient was able to walk since, though gradual worsening pain with walking, flexion  Patient states she took Advil at home, no relief  Denies Hx blood clots, CA, FHx Blood clots, recent travel  Denies previous injury to R knee/hip/ankle/leg, though states Hx of Laminectomy of L4 about 10 years ago, sts this is not like her prior radicular pain, no back pain at this time  Pt reports Hx of TBI several years ago, concussion, sts that she has a walker at home from that event, though at baseline does not have issues walking  Pt does not work  Denies f/c/s, numbness, tingling, weakness, bladder/bowel dysfunction, calf pain/swelling, rashes, and no other complaints at this time  History provided by:  Patient  Knee Pain   Location:  Knee  Injury: yes    Mechanism of injury: fall    Fall:     Fall occurred: while getting off truck  Height of fall:  Standing    Impact surface:  [place]    Point of impact:  Buttocks    Entrapped after fall: no    Knee location:  R knee  Pain details:     Quality: initially sharp pain, now only sharp pain with motion, aching constant pain      Radiates to:  Does not radiate    Severity:  Moderate    Onset quality:  Sudden    Pain timing: intermittent sharp with motion (flexion), constant aching pain  Progression:  Worsening  Chronicity:  New  Dislocation: no    Prior injury to area:  No  Relieved by:  Nothing  Worsened by:  Nothing  Ineffective treatments:  NSAIDs  Associated symptoms: swelling (mild)    Associated symptoms: no back pain, no decreased ROM, no fatigue, no fever, no itching, no muscle weakness, no neck pain, no numbness, no stiffness and no tingling    Risk factors: no concern for non-accidental trauma, no frequent fractures, no known bone disorder and no recent illness          Past Medical and Surgical History    Past Medical History:   Diagnosis Date    Anxiety     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)     Depression     Hyperglycemia     Hyperlipidemia     Lyme disease 2016    Palpitations     Post concussion syndrome        Past Surgical History:   Procedure Laterality Date    BACK SURGERY      lamenectomy l4 and l5     SECTION      ENDOMETRIAL ABLATION      OK COLONOSCOPY FLX DX W/COLLJ SPEC WHEN PFRMD N/A 2016    Procedure: COLONOSCOPY;  Surgeon: Daisha Velasquez MD;  Location: [facility]; Service: Gastroenterology    OK COLONOSCOPY FLX DX W/COLLJ SPEC WHEN PFRMD N/A 2016    Procedure: COLONOSCOPY;  Surgeon: Daisha Velasquez MD;  Location: [facility];   Service: Gastroenterology       Family History   Problem Relation Age of Onset    Hypertension Mother     Stroke Mother         vertebral artery    Coronary artery disease Father     Diabetes Father        Social History     Tobacco Use    Smoking status: Never Smoker    Smokeless tobacco: Never Used    Tobacco comment: stopped yrs ago / former smoker per [employer]   Substance Use Topics    Alcohol use: Yes     Comment: socially    Drug use: No              Allergies    Allergies   Allergen Reactions    Keflex [Cephalexin] Hives     Other reaction(s): Uticaria/Hives    Paroxetine Drowsiness       Home Medications:    Prior to Admission medications Medication Sig Start Date End Date Taking? Authorizing Provider   b complex vitamins tablet Take 1 tablet by mouth daily  Historical Provider, MD   Cholecalciferol (VITAMIN D) 2000 units CAPS Take 1 capsule by mouth daily    Historical Provider, MD   escitalopram (LEXAPRO) 20 mg tablet Take 20 mg by mouth daily  Historical Provider, MD   fluticasone (VERAMYST) 27 5 MCG/SPRAY nasal spray 2 sprays into each nostril daily  Historical Provider, MD   glucosamine-chondroitin 500-400 MG tablet Take 1 tablet by mouth 3 (three) times a day  Historical Provider, MD   Magnesium 250 MG TABS Take by mouth    Historical Provider, MD   montelukast (SINGULAIR) 10 mg tablet Take 10 mg by mouth daily at bedtime  Historical Provider, MD   multivitamin (THERAGRAN) TABS Take 1 tablet by mouth daily  Historical Provider, MD   Omega-3 Fatty Acids (FISH OIL PO) Take by mouth  Historical Provider, MD   pravastatin (PRAVACHOL) 80 mg tablet Take 1 tablet (80 mg total) by mouth daily 2/18/19   Flako Pavon DO   QUEtiapine (SEROQUEL) 50 mg tablet Take by mouth 7/27/17   Historical Provider, MD   SUMAtriptan Succinate 6 MG/0 5ML SOAJ Inject under the skin every 12 (twelve) hours as needed  Historical Provider, MD           Review of Systems    Review of Systems   Constitutional: Negative for activity change, appetite change, chills, diaphoresis, fatigue and fever  HENT: Negative for congestion, dental problem, ear discharge, ear pain, nosebleeds, rhinorrhea, sinus pressure, sinus pain, sore throat, tinnitus and trouble swallowing  Eyes: Negative for pain, discharge, redness, itching and visual disturbance  Respiratory: Negative for apnea, cough, choking, chest tightness, shortness of breath, wheezing and stridor  Cardiovascular: Negative for chest pain, palpitations and leg swelling     Gastrointestinal: Negative for abdominal distention, abdominal pain, anal bleeding, blood in stool, constipation, diarrhea, nausea and vomiting  Genitourinary: Negative for decreased urine volume, difficulty urinating, dysuria and hematuria  Musculoskeletal: Positive for arthralgias (R knee) and gait problem (due to pain)  Negative for back pain, joint swelling, myalgias, neck pain, neck stiffness and stiffness  Skin: Negative for itching, rash and wound  Neurological: Negative for dizziness, speech difficulty, light-headedness, numbness and headaches  All other systems reviewed and negative  Physical Exam      ED Triage Vitals [03/07/19 0136]   Temperature Pulse Respirations Blood Pressure SpO2   98 7 °F (37 1 °C) 68 16 148/81 98 %      Temp Source Heart Rate Source Patient Position - Orthostatic VS BP Location FiO2 (%)   Oral Monitor Sitting Right arm --      Pain Score       8               Physical Exam   Constitutional: She is oriented to person, place, and time  She appears well-developed and well-nourished  No distress  HENT:   Head: Normocephalic and atraumatic  Eyes: Pupils are equal, round, and reactive to light  Neck: Normal range of motion  Neck supple  Cardiovascular: Normal rate, regular rhythm, normal heart sounds and intact distal pulses  Exam reveals no gallop and no friction rub  No murmur heard  Pulmonary/Chest: Effort normal and breath sounds normal  No stridor  No respiratory distress  She has no wheezes  She has no rales  She exhibits no tenderness  Musculoskeletal:        Right hip: Normal         Left hip: Normal         Right knee: She exhibits normal range of motion (painful with bending, transition from seated to standing, standing to seated), no swelling, no effusion, no ecchymosis, no deformity, no laceration, no erythema, normal alignment, no LCL laxity, normal patellar mobility, no bony tenderness and no MCL laxity  Tenderness found  Medial joint line tenderness noted  No lateral joint line, no MCL, no LCL and no patellar tendon tenderness noted          Left knee: Normal         Right ankle: Normal         Left ankle: Normal         Lumbar back: Normal         Right upper leg: Normal         Left upper leg: Normal         Right lower leg: Normal         Left lower leg: Normal         Right foot: Normal         Left foot: Normal    R knee: +Claudette's, Negative Anterior, Posterior Drawer, Negative Ballot, Negative Lachman's, Negative Varus/Valgus stress  No patellar tenderness, normal patellar reflex, normal range of motion though painful flexion, no overlying skin changes, able to flex the knee normally  No signs of quadriceps or patellar tendon injury  Ultrasound performed bedside with suspicion of medial mensicus defect  R Hip: Negative ZAINAB/FADIR    General: R calf 34 cm, L calf 34 cm diameter  No cellulitis, bruising, varicosities  Neurological: She is alert and oriented to person, place, and time  Skin: Skin is warm  Capillary refill takes less than 2 seconds  No rash noted  She is not diaphoretic  Nursing note and vitals reviewed  Diagnostic Results      Labs:    Results for orders placed or performed in visit on 11/26/18   T4, free Lab Collect   Result Value Ref Range    Free T4 1 12 0 76 - 1 46 ng/dL   TSH, 3rd generation Lab Collect   Result Value Ref Range    TSH 3RD GENERATON <0 007 (L) 0 358 - 3 740 uIU/mL   Thyroid stimulating immunoglobulin Lab Collect   Result Value Ref Range    TSI 1 57 (H) 0 00 - 0 55 IU/L       All labs reviewed and utilized in the medical decision making process    Radiology:    XR knee 4+ vw right injury   ED Interpretation   No acute osseous abnormality  Bone spur on medial intercondylar tubercle, no significant effusion            All radiology studies independently viewed by me and interpreted by the radiologist     Procedure    Procedures      Assessment and Plan    MDM  Number of Diagnoses or Management Options  Right knee sprain: new, needed workup     Amount and/or Complexity of Data Reviewed  Tests in the radiology section of CPT®: ordered and reviewed  Obtain history from someone other than the patient: yes  Review and summarize past medical records: yes  Independent visualization of images, tracings, or specimens: yes    Risk of Complications, Morbidity, and/or Mortality  Presenting problems: moderate  Diagnostic procedures: moderate  Management options: moderate    Patient Progress  Patient progress: improved      Initial ED assessment:  Shara Lebron is a 46 y o  female with significant PMH for TBI, Concussion who presents with R knee pain  Vitals signs reviewed  Physical examination remarkable for + Claudette's, +Medial Jointline TTP, US performed bedside with suspicion of medical meniscus defect  Initial Ddx  includes but is not limited to:   arthritis, bursitis, tendinitis, sprain, strain, fracture, doubt cellulitis, osteomyelitis, gout, Lyme disease, Baker's cyst, rheumatologic process; septic arthritis or DVT or arterial occlusion  Initial ED plan:   Plan will be to perform diagnostic testing of XR and treat symptomatically with NSAIDs  MDM  Reviewed: previous chart, nursing note and vitals  Interpretation: x-ray        ED Course of Care and Re-Assessments    ED Course as of Mar 07 0320   Thu Mar 07, 2019   0301 Knee immobilizer applied at pt request to aid in comfort  Rashad Deiters declined--pt has walker at home  Applied a compressive ACE bandage  Recommended rest and elevate the affected painful area, apply cold compresses intermittently as needed  Home care recommendations given with discharge paperwork  Return to ED instructions given if new/worsening sxs  Otherwise f/u with Ortho as discussed                                 Medications   naproxen (NAPROSYN) tablet 500 mg (500 mg Oral Given 3/7/19 0201)         FINAL IMPRESSION    Final diagnoses:   Right knee sprain         DISPOSITION/PLAN      <CRITCARE>      PATIENT REFERRED TO:    Ho Blunt DO  [address] [place] [id number]  [phone]    Call   for follow up    [facility]  [address] [address]  [phone]    If symptoms worsen    [address] [facility]  [address] [facility] [address], [address] [id number] [phone]  Call   tomorrow to schedule an appointment within the next week      DISCHARGE MEDICATIONS:    Discharge Medication List as of 3/7/2019  2:41 AM      CONTINUE these medications which have NOT CHANGED    Details   b complex vitamins tablet Take 1 tablet by mouth daily  , Historical Med      Cholecalciferol (VITAMIN D) 2000 units CAPS Take 1 capsule by mouth daily, Historical Med      escitalopram (LEXAPRO) 20 mg tablet Take 20 mg by mouth daily  , Historical Med      fluticasone (VERAMYST) 27 5 MCG/SPRAY nasal spray 2 sprays into each nostril daily  , Historical Med      glucosamine-chondroitin 500-400 MG tablet Take 1 tablet by mouth 3 (three) times a day , Historical Med      Magnesium 250 MG TABS Take by mouth, Historical Med      montelukast (SINGULAIR) 10 mg tablet Take 10 mg by mouth daily at bedtime  , Historical Med      multivitamin (THERAGRAN) TABS Take 1 tablet by mouth daily  , Historical Med      Omega-3 Fatty Acids (FISH OIL PO) Take by mouth , Historical Med      pravastatin (PRAVACHOL) 80 mg tablet Take 1 tablet (80 mg total) by mouth daily, Starting Mon 2/18/2019, Normal      QUEtiapine (SEROQUEL) 50 mg tablet Take by mouth, Starting Thu 7/27/2017, Historical Med      SUMAtriptan Succinate 6 MG/0 5ML SOAJ Inject under the skin every 12 (twelve) hours as needed , Historical Med             No discharge procedures on file           TOÑO Leung PA-C  03/07/19 4822

## 2019-03-08 ENCOUNTER — OFFICE VISIT (OUTPATIENT)
Dept: OBGYN CLINIC | Facility: MEDICAL CENTER | Age: 54
End: 2019-03-08
Payer: MEDICARE

## 2019-03-08 ENCOUNTER — APPOINTMENT (OUTPATIENT)
Dept: LAB | Facility: MEDICAL CENTER | Age: 54
End: 2019-03-08
Payer: MEDICARE

## 2019-03-08 VITALS
WEIGHT: 186 LBS | HEART RATE: 69 BPM | SYSTOLIC BLOOD PRESSURE: 124 MMHG | DIASTOLIC BLOOD PRESSURE: 80 MMHG | BODY MASS INDEX: 31.76 KG/M2 | HEIGHT: 64 IN

## 2019-03-08 DIAGNOSIS — E05.90 HYPERTHYROIDISM: ICD-10-CM

## 2019-03-08 DIAGNOSIS — M25.561 ACUTE PAIN OF RIGHT KNEE: Primary | ICD-10-CM

## 2019-03-08 LAB
BASOPHILS # BLD AUTO: 0.03 THOUSANDS/ΜL (ref 0–0.1)
BASOPHILS NFR BLD AUTO: 1 % (ref 0–1)
EOSINOPHIL # BLD AUTO: 0.14 THOUSAND/ΜL (ref 0–0.61)
EOSINOPHIL NFR BLD AUTO: 3 % (ref 0–6)
ERYTHROCYTE [DISTWIDTH] IN BLOOD BY AUTOMATED COUNT: 13 % (ref 11.6–15.1)
HCT VFR BLD AUTO: 42.5 % (ref 34.8–46.1)
HGB BLD-MCNC: 14 G/DL (ref 11.5–15.4)
IMM GRANULOCYTES # BLD AUTO: 0.01 THOUSAND/UL (ref 0–0.2)
IMM GRANULOCYTES NFR BLD AUTO: 0 % (ref 0–2)
LYMPHOCYTES # BLD AUTO: 1.54 THOUSANDS/ΜL (ref 0.6–4.47)
LYMPHOCYTES NFR BLD AUTO: 35 % (ref 14–44)
MCH RBC QN AUTO: 30.8 PG (ref 26.8–34.3)
MCHC RBC AUTO-ENTMCNC: 32.9 G/DL (ref 31.4–37.4)
MCV RBC AUTO: 94 FL (ref 82–98)
MONOCYTES # BLD AUTO: 0.33 THOUSAND/ΜL (ref 0.17–1.22)
MONOCYTES NFR BLD AUTO: 7 % (ref 4–12)
NEUTROPHILS # BLD AUTO: 2.4 THOUSANDS/ΜL (ref 1.85–7.62)
NEUTS SEG NFR BLD AUTO: 54 % (ref 43–75)
NRBC BLD AUTO-RTO: 0 /100 WBCS
PLATELET # BLD AUTO: 172 THOUSANDS/UL (ref 149–390)
PMV BLD AUTO: 10.6 FL (ref 8.9–12.7)
RBC # BLD AUTO: 4.54 MILLION/UL (ref 3.81–5.12)
WBC # BLD AUTO: 4.45 THOUSAND/UL (ref 4.31–10.16)

## 2019-03-08 PROCEDURE — 84439 ASSAY OF FREE THYROXINE: CPT

## 2019-03-08 PROCEDURE — 84480 ASSAY TRIIODOTHYRONINE (T3): CPT

## 2019-03-08 PROCEDURE — 85025 COMPLETE CBC W/AUTO DIFF WBC: CPT

## 2019-03-08 PROCEDURE — 99204 OFFICE O/P NEW MOD 45 MIN: CPT | Performed by: ORTHOPAEDIC SURGERY

## 2019-03-08 PROCEDURE — 84443 ASSAY THYROID STIM HORMONE: CPT

## 2019-03-08 PROCEDURE — 36415 COLL VENOUS BLD VENIPUNCTURE: CPT

## 2019-03-08 NOTE — PROGRESS NOTES
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 maintenance    Hyperthyroidism    Leukopenia    Hashimoto's thyroiditis           Plan      Advised patient to take OTC Aleve prn for pain   At this point will hold off on right knee steroid injection today   Discussed Jean-Pierre taping for the right knee   Physical therapy for the right knee   If patient symptoms remain the same or increase, consider ordering MRI Right knee   Follow up 3-4 weeks             Subjective:     Patient ID:    Chief Complaint:Sunni Pires 46 y o  female      HPI    Patient comes in today with regards to right knee pain  The patient reports that the pain is in the right knee  and has been going on for 2-3 days  Patient states she slipped and fell on the ice  Doesn't remember if she fell directly on the right knee  Patient went to the ER and was placed in a knee iimmobilizer brace and was prescribed Tramadol  The pain is rated at0 at its best and8 at its worst   The pain is described as dull ache   It is worsened with bending the knee  and is made better with rest   The patient has taken IBU and icing  for treatment  Patient has no history of right knee pain or has not had any treatments or surgery  The following portions of the patient's history were reviewed and updated as appropriate: allergies, current medications, past family history, past social history, past surgical history and problem list         Social History     Socioeconomic History    Marital status: Legally      Spouse name: Not on file    Number of children: Not on file    Years of education: Not on file    Highest education level: Not on file   Occupational History    Occupation: employed   Social Needs    Financial resource strain: Not on file    Food insecurity:     Worry: Not on file     Inability: Not on file   [employer] needs:     Medical: Not on file     Non-medical: Not on file   Tobacco Use    Smoking status: Never Smoker    Smokeless tobacco: Never Used    Tobacco comment: stopped yrs ago / former smoker per [employer]   Substance and Sexual Activity    Alcohol use: Yes     Comment: socially    Drug use: No    Sexual activity: Not on file   Lifestyle    Physical activity:     Days per week: Not on file     Minutes per session: Not on file    Stress: Not on file   Relationships    Social connections:     Talks on phone: Not on file     Gets together: Not on file     Attends Buddhist service: Not on file     Active member of club or organization: Not on file     Attends meetings of clubs or organizations: Not on file     Relationship status: Not on file    Intimate partner violence:     Fear of current or ex partner: Not on file     Emotionally abused: Not on file     Physically abused: Not on file     Forced sexual activity: Not on file   Other Topics Concern    Not on file   Social History Narrative    Daily coffee consumption; 3 cp/day    No second hand smoke exposure     Past Medical History:   Diagnosis Date    Anxiety     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)     Depression     Hyperglycemia     Hyperlipidemia     Lyme disease 04/04/2016    Palpitations     Post concussion syndrome      Past Surgical History:   Procedure Laterality Date    BACK SURGERY      lamenectomy l4 and l5     SECTION      ENDOMETRIAL ABLATION      [place] COLONOSCOPY FLX DX W/COLLJ SPEC WHEN PFRMD N/A 2016    Procedure: COLONOSCOPY;  Surgeon: Luther Sanchez MD;  Location: [facility]; Service: Gastroenterology    MT COLONOSCOPY FLX DX W/COLLJ SPEC WHEN PFRMD N/A 2016    Procedure: COLONOSCOPY;  Surgeon: Luther Sanchez MD;  Location: [facility]; Service: Gastroenterology     Allergies   Allergen Reactions    Keflex [Cephalexin] Hives     Other reaction(s): Uticaria/Hives    Paroxetine Drowsiness     Current Outpatient Medications on File Prior to Visit   Medication Sig Dispense Refill    b complex vitamins tablet Take 1 tablet by mouth daily   Cholecalciferol (VITAMIN D) 2000 units CAPS Take 1 capsule by mouth daily      escitalopram (LEXAPRO) 20 mg tablet Take 20 mg by mouth daily   fluticasone (VERAMYST) 27 5 MCG/SPRAY nasal spray 2 sprays into each nostril daily   glucosamine-chondroitin 500-400 MG tablet Take 1 tablet by mouth 3 (three) times a day   Magnesium 250 MG TABS Take by mouth      montelukast (SINGULAIR) 10 mg tablet Take 10 mg by mouth daily at bedtime   multivitamin (THERAGRAN) TABS Take 1 tablet by mouth daily   Omega-3 Fatty Acids (FISH OIL PO) Take by mouth   pravastatin (PRAVACHOL) 80 mg tablet Take 1 tablet (80 mg total) by mouth daily 90 tablet 2    QUEtiapine (SEROQUEL) 50 mg tablet Take by mouth      SUMAtriptan Succinate 6 MG/0 5ML SOAJ Inject under the skin every 12 (twelve) hours as needed  No current facility-administered medications on file prior to visit                 Objective:    Review of Systems    Right Knee Exam     Tests   Claudette:  Medial - negative Lateral - negative  Drawer:  Anterior - negative    Posterior - negative    Other   Erythema: absent  Scars: absent  Sensation: normal  Pulse: present  Swelling: mild  Effusion: no effusion present    Comments:  Mild swelling around the patella   Lateral stress causes medial pain   No TTP over medial and lateral joint line   Bounce Test: Negative   No pain popliteal  space  Strength 5/5             Physical Exam   Musculoskeletal:        Right knee: She exhibits no effusion  Procedures  No Procedures performed today    I have personally reviewed pertinent films in PACS and my interpretation is   XR Right knee no acute osseous abnormality  Portions of the record may have been created with voice recognition software   Occasional wrong word or "sound a like" substitutions may have occurred due to the inherent limitations of voice recognition software   Read the chart carefully and recognize, using context, where substitutions have occurred

## 2019-03-08 NOTE — PROGRESS NOTES
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)     Depression     Hyperglycemia     Hyperlipidemia     Lyme disease 2016    Palpitations     Post concussion syndrome        Past Surgical History:   Procedure Laterality Date    BACK SURGERY      lamenectomy l4 and l5     SECTION      ENDOMETRIAL ABLATION      LA COLONOSCOPY FLX DX W/COLLJ SPEC WHEN PFRMD N/A 2016    Procedure: COLONOSCOPY;  Surgeon: Stephenie Almanzar MD;  Location: [facility] GI LAB; Service: Gastroenterology    LA COLONOSCOPY FLX DX W/COLLJ SPEC WHEN PFRMD N/A 2016    Procedure: COLONOSCOPY;  Surgeon: Stephenie Almanzar MD;  Location: [facility] GI LAB; Service: Gastroenterology       Family History   Problem Relation Age of Onset    Hypertension Mother     Stroke Mother         vertebral artery    Coronary artery disease Father     Diabetes Father        Social History     Occupational History    Occupation: employed   Tobacco Use    Smoking status: Never Smoker    Smokeless tobacco: Never Used    Tobacco comment: stopped yrs ago / former smoker per [employer]   Substance and Sexual Activity    Alcohol use: Yes     Comment: socially    Drug use: No    Sexual activity: Not on file         Current Outpatient Medications:     b complex vitamins tablet, Take 1 tablet by mouth daily  , Disp: , Rfl:     Cholecalciferol (VITAMIN D) 2000 units CAPS, Take 1 capsule by mouth daily, Disp: , Rfl:     escitalopram (LEXAPRO) 20 mg tablet, Take 20 mg by mouth daily  , Disp: , Rfl:     fluticasone (VERAMYST) 27 5 MCG/SPRAY nasal spray, 2 sprays into each nostril daily  , Disp: , Rfl:     glucosamine-chondroitin 500-400 MG tablet, Take 1 tablet by mouth 3 (three) times a day , Disp: , Rfl:     Magnesium 250 MG TABS, Take by mouth, Disp: , Rfl:     montelukast (SINGULAIR) 10 mg tablet, Take 10 mg by mouth daily at bedtime  , Disp: , Rfl:     multivitamin (THERAGRAN) TABS, Take 1 tablet by mouth daily  , Disp: , Rfl:     Omega-3 Fatty Acids (FISH OIL PO), Take by mouth , Disp: , Rfl:     pravastatin (PRAVACHOL) 80 mg tablet, Take 1 tablet (80 mg total) by mouth daily, Disp: 90 tablet, Rfl: 2    QUEtiapine (SEROQUEL) 50 mg tablet, Take by mouth, Disp: , Rfl:     SUMAtriptan Succinate 6 MG/0 5ML SOAJ, Inject under the skin every 12 (twelve) hours as needed  , Disp: , Rfl:     Allergies   Allergen Reactions    Keflex [Cephalexin] Hives     Other reaction(s): Uticaria/Hives    Paroxetine Drowsiness          There were no vitals filed for this visit  Objective:            Physical Exam  · General: Awake, Alert, Oriented  · Eyes: Pupils equal, round and reactive to light  · Heart: regular rate and rhythm  · Lungs: No audible wheezing  · Abdomen: soft                    Ortho Exam      Neurovascularly Intact Distally     Diagnostics, reviewed and taken today if performed as documented:    None performed      The attending physician has personally reviewed the pertinent films in PACS and interpretation is as follows:      Procedures, if performed today:    Procedures    None performed      Scribe Attestation    I,:    am acting as a scribe while in the presence of the attending physician :        I,:    personally performed the services described in this documentation    as scribed in my presence :              Portions of the record may have been created with voice recognition software    Occasional wrong word or "sound a like" substitutions may have occurred due to the inherent limitations of voice recognition software  Read the chart carefully and recognize, using context, where substitutions have occurred

## 2019-03-09 LAB
T3 SERPL-MCNC: 0.9 NG/ML (ref 0.6–1.8)
T4 FREE SERPL-MCNC: 0.83 NG/DL (ref 0.76–1.46)
TSH SERPL DL<=0.05 MIU/L-ACNC: 0.12 UIU/ML (ref 0.36–3.74)

## 2019-03-14 ENCOUNTER — OFFICE VISIT (OUTPATIENT)
Dept: ENDOCRINOLOGY | Facility: CLINIC | Age: 54
End: 2019-03-14
Payer: MEDICARE

## 2019-03-14 VITALS
DIASTOLIC BLOOD PRESSURE: 70 MMHG | HEIGHT: 64 IN | WEIGHT: 186.4 LBS | SYSTOLIC BLOOD PRESSURE: 120 MMHG | BODY MASS INDEX: 31.82 KG/M2 | HEART RATE: 64 BPM

## 2019-03-14 DIAGNOSIS — E06.3 HASHIMOTO'S THYROIDITIS: Primary | ICD-10-CM

## 2019-03-14 PROCEDURE — 99213 OFFICE O/P EST LOW 20 MIN: CPT | Performed by: INTERNAL MEDICINE

## 2019-03-14 NOTE — ASSESSMENT & PLAN NOTE
TSH is improving-continue to monitor and repeat thyroid function test in the next month    She may become hypothyroid and will need thyroid hormone replacement

## 2019-03-14 NOTE — PROGRESS NOTES
Corene Stage 46 y o  female MRN: [id number]    Encounter: [id number]      Assessment/Plan     Problem List Items Addressed This Visit        Endocrine    Hashimoto's thyroiditis - Primary     TSH is improving-continue to monitor and repeat thyroid function test in the next month  She may become hypothyroid and will need thyroid hormone replacement         Relevant Orders    T4, free Lab Collect    TSH, 3rd generation Lab Collect        CC:   Thyroiditis    History of Present Illness     HPI:  58-year-old female with abnormal thyroid function tests here for follow-up  Since her last visit she is feeling a little better as far as palpitations are concerned,  Otherwise generally feels well    Review of Systems   Constitutional: Negative for fatigue and unexpected weight change  Respiratory: Negative for cough and shortness of breath  Cardiovascular: Negative for palpitations and leg swelling  Gastrointestinal: Negative for constipation, diarrhea and nausea  Endocrine: Negative for cold intolerance and heat intolerance  Musculoskeletal: Positive for gait problem  Negative for arthralgias and myalgias  Skin: Negative for wound  Psychiatric/Behavioral: Positive for sleep disturbance  Historical Information   Past Medical History:   Diagnosis Date    Anxiety     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)     Depression     Hyperglycemia     Hyperlipidemia     Lyme disease 2016    Palpitations     Post concussion syndrome      Past Surgical History:   Procedure Laterality Date    BACK SURGERY      lamenectomy l4 and l5     SECTION      ENDOMETRIAL ABLATION      [place] COLONOSCOPY FLX DX W/COLLJ SPEC WHEN PFRMD N/A 2016    Procedure: COLONOSCOPY;  Surgeon: Lela Poole MD;  Location: [facility] GI LAB;   Service: Gastroenterology    [place] COLONOSCOPY FLX DX W/COLLJ SPEC WHEN PFRMD N/A 2016    Procedure: COLONOSCOPY;  Surgeon: Lela Poole MD;  Location: [facility] GI LAB;  Service: Gastroenterology     Social History   Social History     Substance and Sexual Activity   Alcohol Use Yes    Comment: socially     Social History     Substance and Sexual Activity   Drug Use No     Social History     Tobacco Use   Smoking Status Never Smoker   Smokeless Tobacco Never Used   Tobacco Comment    stopped yrs ago / former smoker per [employer]     Family History:   Family History   Problem Relation Age of Onset    Hypertension Mother     Stroke Mother         vertebral artery    Coronary artery disease Father     Diabetes Father        Meds/Allergies   Current Outpatient Medications   Medication Sig Dispense Refill    b complex vitamins tablet Take 1 tablet by mouth daily   Cholecalciferol (VITAMIN D) 2000 units CAPS Take 1 capsule by mouth daily      escitalopram (LEXAPRO) 20 mg tablet Take 20 mg by mouth daily   fluticasone (VERAMYST) 27 5 MCG/SPRAY nasal spray 2 sprays into each nostril daily   glucosamine-chondroitin 500-400 MG tablet Take 1 tablet by mouth 3 (three) times a day   Magnesium 250 MG TABS Take by mouth      montelukast (SINGULAIR) 10 mg tablet Take 10 mg by mouth daily at bedtime   multivitamin (THERAGRAN) TABS Take 1 tablet by mouth daily   Omega-3 Fatty Acids (FISH OIL PO) Take by mouth   pravastatin (PRAVACHOL) 80 mg tablet Take 1 tablet (80 mg total) by mouth daily 90 tablet 2    QUEtiapine (SEROQUEL) 50 mg tablet Take by mouth      SUMAtriptan Succinate 6 MG/0 5ML SOAJ Inject under the skin every 12 (twelve) hours as needed  No current facility-administered medications for this visit  Allergies   Allergen Reactions    Keflex [Cephalexin] Hives     Other reaction(s): Uticaria/Hives    Paroxetine Drowsiness       Objective   Vitals: Blood pressure 120/70, pulse 64, height 5' 4" (1 626 m), weight 84 6 kg (186 lb 6 4 oz)  Physical Exam   Constitutional: She is oriented to person, place, and time   She appears well-developed and well-nourished  No distress  HENT:   Head: Normocephalic and atraumatic  Mouth/Throat: No oropharyngeal exudate  Eyes: Pupils are equal, round, and reactive to light  EOM are normal    Neck: Normal range of motion  Neck supple  No thyromegaly present  Cardiovascular: Normal rate, regular rhythm and normal heart sounds  No murmur heard  Pulmonary/Chest: Effort normal and breath sounds normal  No stridor  No respiratory distress  Abdominal: Soft  Bowel sounds are normal  She exhibits no distension  There is no tenderness  Musculoskeletal: Normal range of motion  She exhibits no edema  Neurological: She is alert and oriented to person, place, and time  Skin: Skin is warm and dry  Psychiatric: She has a normal mood and affect  Her behavior is normal  Judgment and thought content normal        The history was obtained from the review of the chart, patient  Lab Results:   Lab Results   Component Value Date/Time    TSH 3RD GENERATON 0 118 (L) 03/08/2019 01:52 PM    TSH 3RD GENERATON <0 007 (L) 11/26/2018 10:01 AM    TSH 3RD GENERATON <0 007 (L) 11/13/2018 09:15 AM    Free T4 0 83 03/08/2019 01:52 PM    Free T4 1 12 11/26/2018 10:01 AM    Free T4 1 51 (H) 11/13/2018 09:15 AM       Imaging Studies:   Results for orders placed during the hospital encounter of 10/19/18   US thyroid    Impression Mildly heterogeneous and hypervascular thyroid           Workstation performed: [id number]         I have personally reviewed pertinent reports  Portions of the record may have been created with voice recognition software  Occasional wrong word or "sound a like" substitutions may have occurred due to the inherent limitations of voice recognition software  Read the chart carefully and recognize, using context, where substitutions have occurred

## 2019-06-24 ENCOUNTER — TELEPHONE (OUTPATIENT)
Dept: ENDOCRINOLOGY | Facility: CLINIC | Age: 54
End: 2019-06-24

## 2019-06-24 ENCOUNTER — LAB (OUTPATIENT)
Dept: LAB | Facility: MEDICAL CENTER | Age: 54
End: 2019-06-24
Payer: MEDICARE

## 2019-06-24 DIAGNOSIS — E06.3 HASHIMOTO'S THYROIDITIS: ICD-10-CM

## 2019-06-24 DIAGNOSIS — E05.90 HYPERTHYROIDISM: Primary | ICD-10-CM

## 2019-06-24 LAB
T4 FREE SERPL-MCNC: 0.76 NG/DL (ref 0.76–1.46)
TSH SERPL DL<=0.05 MIU/L-ACNC: 1.14 UIU/ML (ref 0.36–3.74)

## 2019-06-24 PROCEDURE — 36415 COLL VENOUS BLD VENIPUNCTURE: CPT

## 2019-06-24 PROCEDURE — 84443 ASSAY THYROID STIM HORMONE: CPT

## 2019-06-24 PROCEDURE — 84439 ASSAY OF FREE THYROXINE: CPT

## 2019-09-16 ENCOUNTER — LAB (OUTPATIENT)
Dept: LAB | Facility: CLINIC | Age: 54
End: 2019-09-16
Payer: MEDICARE

## 2019-09-16 DIAGNOSIS — E05.90 HYPERTHYROIDISM: ICD-10-CM

## 2019-09-16 LAB
T4 FREE SERPL-MCNC: 0.85 NG/DL (ref 0.76–1.46)
TSH SERPL DL<=0.05 MIU/L-ACNC: 0.69 UIU/ML (ref 0.36–3.74)

## 2019-09-16 PROCEDURE — 36415 COLL VENOUS BLD VENIPUNCTURE: CPT

## 2019-09-16 PROCEDURE — 84443 ASSAY THYROID STIM HORMONE: CPT

## 2019-09-16 PROCEDURE — 84439 ASSAY OF FREE THYROXINE: CPT

## 2019-09-19 ENCOUNTER — TELEPHONE (OUTPATIENT)
Dept: ENDOCRINOLOGY | Facility: CLINIC | Age: 54
End: 2019-09-19

## 2019-09-19 NOTE — TELEPHONE ENCOUNTER
----- Message from Ubaldo Whiteside MD sent at 9/19/2019  9:37 AM EDT -----  Please call the patient regarding labs - thyroid function tests are normal

## 2019-09-23 ENCOUNTER — OFFICE VISIT (OUTPATIENT)
Dept: ENDOCRINOLOGY | Facility: CLINIC | Age: 54
End: 2019-09-23
Payer: MEDICARE

## 2019-09-23 VITALS
BODY MASS INDEX: 31.69 KG/M2 | WEIGHT: 185.6 LBS | HEART RATE: 70 BPM | SYSTOLIC BLOOD PRESSURE: 112 MMHG | DIASTOLIC BLOOD PRESSURE: 70 MMHG | HEIGHT: 64 IN

## 2019-09-23 DIAGNOSIS — E06.3 HASHIMOTO'S THYROIDITIS: Primary | ICD-10-CM

## 2019-09-23 PROCEDURE — 99213 OFFICE O/P EST LOW 20 MIN: CPT | Performed by: PHYSICIAN ASSISTANT

## 2019-09-23 RX ORDER — GLUCOSA SU 2KCL/CHONDROITIN SU 500-400 MG
1 CAPSULE ORAL DAILY
COMMUNITY

## 2019-09-23 NOTE — PROGRESS NOTES
Established Patient Progress Note       Chief Complaint   Patient presents with    Thyroid Problem        History of Present Illness: Stacey Cr is a 53 y o  female with a history of thyroid dysfunction  She has had history of hyperthyroidism which has improved, she was not treated with meds  She has had + antibodies for both hashimoto's and graves disease  Currently she is feeling well with no symptoms of hypo/hyperthyroidism  Patient Active Problem List   Diagnosis    Allergic rhinitis    Ambulatory dysfunction    Anxiety state    Aphasia with TBI (traumatic brain injury), closed    Cervical disc disease    Chronic post-traumatic headache    Concussion    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    Depression with anxiety    Fatigue    Generalized anxiety disorder    Hematuria, microscopic    Hemiparesis, right (HCC)    Hyperglycemia    Hyperlipidemia    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[facility] [address] [facility]    Menopausal and postmenopausal disorder    Neck pain    Neck sprain and strain    Palpitations    Post concussion syndrome    Post-concussion headache    Speech abnormality    Stress incontinence in female    Traumatic brain injury ([facility] [address] )   [facility] maintenance    Hyperthyroidism    Leukopenia    Hashimoto's thyroiditis      Past Medical History:   Diagnosis Date    Anxiety     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 WHEN PFRMD N/A 2016    Procedure: COLONOSCOPY;  Surgeon: Chato Patel Dwayne Mccray MD;  Location: [facility]; Service: Gastroenterology      Family History   Problem Relation Age of Onset    Hypertension Mother     Stroke Mother         vertebral artery    Coronary artery disease Father     Diabetes Father      Social History     Tobacco Use    Smoking status: Never Smoker    Smokeless tobacco: Never Used    Tobacco comment: stopped yrs ago / former smoker per [employer]   Substance Use Topics    Alcohol use: Yes     Comment: socially     Allergies   Allergen Reactions    Keflex [Cephalexin] Hives     Other reaction(s): Uticaria/Hives    Paroxetine Drowsiness       Current Outpatient Medications:     b complex vitamins tablet, Take 1 tablet by mouth daily  , Disp: , Rfl:     Cholecalciferol (VITAMIN D) 2000 units CAPS, Take 1 capsule by mouth daily, Disp: , Rfl:     Coenzyme Q10 (CO Q10) 100 MG CAPS, Take by mouth, Disp: , Rfl:     escitalopram (LEXAPRO) 20 mg tablet, Take 20 mg by mouth daily  , Disp: , Rfl:     fluticasone (VERAMYST) 27 5 MCG/SPRAY nasal spray, 2 sprays into each nostril daily  , Disp: , Rfl:     glucosamine-chondroitin 500-400 MG tablet, Take 1 tablet by mouth 3 (three) times a day , Disp: , Rfl:     montelukast (SINGULAIR) 10 mg tablet, Take 10 mg by mouth daily at bedtime  , Disp: , Rfl:     multivitamin (THERAGRAN) TABS, Take 1 tablet by mouth daily  , Disp: , Rfl:     Omega-3 Fatty Acids (FISH OIL PO), Take by mouth , Disp: , Rfl:     pravastatin (PRAVACHOL) 80 mg tablet, Take 1 tablet (80 mg total) by mouth daily, Disp: 90 tablet, Rfl: 2    QUEtiapine (SEROQUEL) 50 mg tablet, Take by mouth, Disp: , Rfl:     SUMAtriptan Succinate 6 MG/0 5ML SOAJ, Inject under the skin every 12 (twelve) hours as needed  , Disp: , Rfl:     Review of Systems   Constitutional: Negative for activity change, appetite change, chills, diaphoresis, fatigue, fever and unexpected weight change  HENT: Negative for trouble swallowing and voice change      Eyes: Negative for visual disturbance  Respiratory: Negative for shortness of breath  Cardiovascular: Negative for chest pain and palpitations  Gastrointestinal: Negative for abdominal pain, constipation and diarrhea  Endocrine: Negative for cold intolerance, heat intolerance, polydipsia, polyphagia and polyuria  Genitourinary: Negative for frequency and menstrual problem  Musculoskeletal: Positive for gait problem  Negative for arthralgias and myalgias  Skin: Negative for rash  Allergic/Immunologic: Negative for food allergies  Neurological: Negative for dizziness and tremors  Hematological: Negative for adenopathy  Psychiatric/Behavioral: Negative for sleep disturbance  All other systems reviewed and are negative  Physical Exam:  Body mass index is 31 86 kg/m²  /70   Pulse 70   Ht 5' 4" (1 626 m)   Wt 84 2 kg (185 lb 9 6 oz)   BMI 31 86 kg/m²    Wt Readings from Last 3 Encounters:   09/23/19 84 2 kg (185 lb 9 6 oz)   03/14/19 84 6 kg (186 lb 6 4 oz)   03/08/19 84 4 kg (186 lb)       Physical Exam   Constitutional: She is oriented to person, place, and time  She appears well-developed and well-nourished  No distress  HENT:   Head: Normocephalic and atraumatic  Eyes: Pupils are equal, round, and reactive to light  Conjunctivae are normal    Neck: Normal range of motion  Neck supple  No thyromegaly present  Cardiovascular: Normal rate, regular rhythm and normal heart sounds  Pulmonary/Chest: Effort normal and breath sounds normal  No respiratory distress  She has no wheezes  She has no rales  Abdominal: Soft  Bowel sounds are normal  She exhibits no distension  There is no tenderness  Musculoskeletal: Normal range of motion  She exhibits no edema  Neurological: She is alert and oriented to person, place, and time  Skin: Skin is warm and dry  Psychiatric: She has a normal mood and affect  Vitals reviewed        Labs:   Component      Latest Ref Rng & Units 10/18/2018 11/26/2018 6/24/2019   THYROID MICROSOMAL ANTIBODY      0 - 34 IU/mL 69 (H)     THYROGLOBULIN AB      0 0 - 0 9 IU/mL 2 3 (H)     THYROID STIMULATING IMMUNOGLOBULIN      0 00 - 0 55 IU/L  1 57 (H)    Free T4      0 76 - 1 46 ng/dL   0 76   TSH 3RD GENERATON      0 358 - 3 740 uIU/mL   1 140     Component      Latest Ref Rng & Units 9/16/2019   THYROID MICROSOMAL ANTIBODY      0 - 34 IU/mL    THYROGLOBULIN AB      0 0 - 0 9 IU/mL    THYROID STIMULATING IMMUNOGLOBULIN      0 00 - 0 55 IU/L    Free T4      0 76 - 1 46 ng/dL 0 85   TSH 3RD GENERATON      0 358 - 3 740 uIU/mL 0 691     Impression & Plan:    Problem List Items Addressed This Visit        Endocrine    Hashimoto's thyroiditis - Primary    Relevant Orders    TSH, 3rd generation    T4, free          Orders Placed This Encounter   Procedures    TSH, 3rd generation     This is a patient instruction: This test is non-fasting  Please drink two glasses of water morning of bloodwork  Standing Status:   Future     Standing Expiration Date:   9/23/2020    T4, free     Standing Status:   Future     Standing Expiration Date:   9/23/2020       There are no Patient Instructions on file for this visit  Discussed with the patient and all questioned fully answered  She will call me if any problems arise  Follow-up appointment in 6 months       Counseled patient on diagnostic results, prognosis, risk and benefit of treatment options, instruction for management, importance of treatment compliance, Risk  factor reduction and impressions      Dominique Lacey PA-C

## 2019-11-04 DIAGNOSIS — E78.5 HYPERLIPIDEMIA, UNSPECIFIED HYPERLIPIDEMIA TYPE: ICD-10-CM

## 2019-11-04 RX ORDER — PRAVASTATIN SODIUM 80 MG/1
TABLET ORAL
Qty: 90 TABLET | Refills: 2 | OUTPATIENT
Start: 2019-11-04

## 2019-12-10 DIAGNOSIS — E78.5 HYPERLIPIDEMIA, UNSPECIFIED HYPERLIPIDEMIA TYPE: ICD-10-CM

## 2019-12-11 RX ORDER — PRAVASTATIN SODIUM 80 MG/1
TABLET ORAL
Qty: 90 TABLET | Refills: 2 | OUTPATIENT
Start: 2019-12-11

## 2020-01-09 ENCOUNTER — TRANSCRIBE ORDERS (OUTPATIENT)
Dept: ADMINISTRATIVE | Facility: HOSPITAL | Age: 55
End: 2020-01-09

## 2020-01-09 ENCOUNTER — APPOINTMENT (OUTPATIENT)
Dept: LAB | Facility: MEDICAL CENTER | Age: 55
End: 2020-01-09
Payer: MEDICARE

## 2020-01-09 DIAGNOSIS — E03.9 HYPOTHYROIDISM, UNSPECIFIED TYPE: ICD-10-CM

## 2020-01-09 DIAGNOSIS — E78.5 HYPERLIPIDEMIA, UNSPECIFIED HYPERLIPIDEMIA TYPE: Primary | ICD-10-CM

## 2020-01-09 LAB
ALBUMIN SERPL BCP-MCNC: 4 G/DL (ref 3.5–5)
ALP SERPL-CCNC: 79 U/L (ref 46–116)
ALT SERPL W P-5'-P-CCNC: 37 U/L (ref 12–78)
ANION GAP SERPL CALCULATED.3IONS-SCNC: 4 MMOL/L (ref 4–13)
AST SERPL W P-5'-P-CCNC: 20 U/L (ref 5–45)
BILIRUB SERPL-MCNC: 0.33 MG/DL (ref 0.2–1)
BUN SERPL-MCNC: 16 MG/DL (ref 5–25)
CALCIUM SERPL-MCNC: 9.4 MG/DL (ref 8.3–10.1)
CHLORIDE SERPL-SCNC: 109 MMOL/L (ref 100–108)
CHOLEST SERPL-MCNC: 229 MG/DL (ref 50–200)
CO2 SERPL-SCNC: 29 MMOL/L (ref 21–32)
CREAT SERPL-MCNC: 1 MG/DL (ref 0.6–1.3)
GFR SERPL CREATININE-BSD FRML MDRD: 64 ML/MIN/1.73SQ M
GLUCOSE P FAST SERPL-MCNC: 99 MG/DL (ref 65–99)
HDLC SERPL-MCNC: 55 MG/DL
LDLC SERPL CALC-MCNC: 147 MG/DL (ref 0–100)
NONHDLC SERPL-MCNC: 174 MG/DL
POTASSIUM SERPL-SCNC: 4.1 MMOL/L (ref 3.5–5.3)
PROT SERPL-MCNC: 7.7 G/DL (ref 6.4–8.2)
SODIUM SERPL-SCNC: 142 MMOL/L (ref 136–145)
T4 FREE SERPL-MCNC: 0.76 NG/DL (ref 0.76–1.46)
TRIGL SERPL-MCNC: 133 MG/DL
TSH SERPL DL<=0.05 MIU/L-ACNC: 0.64 UIU/ML (ref 0.36–3.74)

## 2020-01-09 PROCEDURE — 80061 LIPID PANEL: CPT | Performed by: FAMILY MEDICINE

## 2020-01-09 PROCEDURE — 36415 COLL VENOUS BLD VENIPUNCTURE: CPT | Performed by: FAMILY MEDICINE

## 2020-01-09 PROCEDURE — 84443 ASSAY THYROID STIM HORMONE: CPT | Performed by: FAMILY MEDICINE

## 2020-01-09 PROCEDURE — 80053 COMPREHEN METABOLIC PANEL: CPT | Performed by: FAMILY MEDICINE

## 2020-01-09 PROCEDURE — 84439 ASSAY OF FREE THYROXINE: CPT | Performed by: FAMILY MEDICINE

## 2020-03-30 ENCOUNTER — TELEMEDICINE (OUTPATIENT)
Dept: ENDOCRINOLOGY | Facility: CLINIC | Age: 55
End: 2020-03-30
Payer: MEDICARE

## 2020-03-30 DIAGNOSIS — E06.3 HASHIMOTO'S THYROIDITIS: Primary | ICD-10-CM

## 2020-03-30 PROBLEM — E05.90 HYPERTHYROIDISM: Status: RESOLVED | Noted: 2018-10-18 | Resolved: 2020-03-30

## 2020-03-30 PROCEDURE — 99441 PR PHYS/QHP TELEPHONE EVALUATION 5-10 MIN: CPT | Performed by: INTERNAL MEDICINE

## 2020-03-30 NOTE — PROGRESS NOTES
Virtual Brief Visit    Problem List Items Addressed This Visit     None                Reason for visit is Hashimoto thyroiditis    Encounter provider Helio Haynes MD    Provider located at [address] [facility] [address] [place] [id number]      Recent Visits  No visits were found meeting these conditions  Showing recent visits within past 7 days and meeting all other requirements     Today's Visits  Date Type Provider Dept   20 Telemedicine Helio Haynes MD [facility] 23   Showing today's visits and meeting all other requirements     Future Appointments  No visits were found meeting these conditions  Showing future appointments within next 150 days and meeting all other requirements        After connecting through telephone, the patient was identified by name and date of birth  Evaristo Foster was informed that this is a telemedicine visit and that the visit is being conducted through telephone  My office door was closed  No one else was in the room  She acknowledged consent and understanding of privacy and security of the video platform  The patient has agreed to participate and understands they can discontinue the visit at any time  Patient is aware this is a billable service  Subjective  Evaristo Foster is a 53 y o  female with Hashimoto thyroiditis and low TSH  for follow-up    She generally feels well and has no acute complaints except some fatigue and sleep disturbances      Past Medical History:   Diagnosis Date    Anxiety     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)     Depression     Hyperglycemia     Hyperlipidemia     Lyme disease 2016    Palpitations     Post concussion syndrome        Past Surgical History:   Procedure Laterality Date    BACK SURGERY      lamenectomy l4 and l5     SECTION      ENDOMETRIAL ABLATION      [place] COLONOSCOPY FLX DX W/COLLJ SPEC WHEN PFRMD N/A 8/17/2016    Procedure: COLONOSCOPY;  Surgeon: Mary Jane Golden MD;  Location: [facility]; Service: Gastroenterology    [place] COLONOSCOPY FLX DX W/COLLJ SPEC WHEN PFRMD N/A 8/16/2016    Procedure: COLONOSCOPY;  Surgeon: Mary Jane Golden MD;  Location: [facility]; Service: Gastroenterology       Current Outpatient Medications   Medication Sig Dispense Refill    b complex vitamins tablet Take 1 tablet by mouth daily   Cholecalciferol (VITAMIN D) 2000 units CAPS Take 1 capsule by mouth daily      Coenzyme Q10 (CO Q10) 100 MG CAPS Take by mouth      escitalopram (LEXAPRO) 20 mg tablet Take 20 mg by mouth daily   fluticasone (VERAMYST) 27 5 MCG/SPRAY nasal spray 2 sprays into each nostril daily   glucosamine-chondroitin 500-400 MG tablet Take 1 tablet by mouth 3 (three) times a day   montelukast (SINGULAIR) 10 mg tablet Take 10 mg by mouth daily at bedtime   multivitamin (THERAGRAN) TABS Take 1 tablet by mouth daily   Omega-3 Fatty Acids (FISH OIL PO) Take by mouth   pravastatin (PRAVACHOL) 80 mg tablet Take 1 tablet (80 mg total) by mouth daily 90 tablet 2    QUEtiapine (SEROQUEL) 50 mg tablet Take by mouth      SUMAtriptan Succinate 6 MG/0 5ML SOAJ Inject under the skin every 12 (twelve) hours as needed  No current facility-administered medications for this visit  Allergies   Allergen Reactions    Keflex [Cephalexin] Hives     Other reaction(s): Uticaria/Hives    Paroxetine Drowsiness       Review of Systems   Constitutional: Positive for fatigue  Negative for unexpected weight change  Respiratory: Negative for cough and shortness of breath  Cardiovascular: Negative for palpitations and leg swelling  Gastrointestinal: Negative for constipation, diarrhea, nausea and vomiting  Musculoskeletal: Positive for arthralgias and myalgias  Negative for gait problem  Skin: Negative for rash and wound  Neurological: Negative for tremors  Psychiatric/Behavioral: Positive for sleep disturbance  Labs-January 2020-TSH 0 63    I spent 10 minutes with the patient during this visit

## 2020-12-08 ENCOUNTER — NURSE TRIAGE (OUTPATIENT)
Dept: OTHER | Facility: OTHER | Age: 55
End: 2020-12-08

## 2020-12-08 DIAGNOSIS — Z11.59 SPECIAL SCREENING EXAMINATION FOR VIRAL DISEASE: ICD-10-CM

## 2020-12-08 DIAGNOSIS — Z11.59 SPECIAL SCREENING EXAMINATION FOR VIRAL DISEASE: Primary | ICD-10-CM

## 2020-12-08 PROCEDURE — U0003 INFECTIOUS AGENT DETECTION BY NUCLEIC ACID (DNA OR RNA); SEVERE ACUTE RESPIRATORY SYNDROME CORONAVIRUS 2 (SARS-COV-2) (CORONAVIRUS DISEASE [COVID-19]), AMPLIFIED PROBE TECHNIQUE, MAKING USE OF HIGH THROUGHPUT TECHNOLOGIES AS DESCRIBED BY CMS-2020-01-R: HCPCS | Performed by: FAMILY MEDICINE

## 2020-12-10 ENCOUNTER — TELEPHONE (OUTPATIENT)
Dept: INTERNAL MEDICINE CLINIC | Facility: CLINIC | Age: 55
End: 2020-12-10

## 2020-12-10 LAB — SARS-COV-2 RNA SPEC QL NAA+PROBE: DETECTED

## 2020-12-10 NOTE — RESULT ENCOUNTER NOTE
I spoke with Annie Boyle and let her know that her COVID-19 swab was positive  Continue symptomatic treatment  Advised she implement home isolation measures including      Staying home  Stay in a specific "sick room" or area and away from other people or animals, including pets  Wear a mask when leaving your room  Use a separate bathroom, if available  Wipe down all commonly touched surfaces with household   Please schedule follow up video visit tomorrow

## 2021-03-30 ENCOUNTER — TRANSCRIBE ORDERS (OUTPATIENT)
Dept: ADMINISTRATIVE | Facility: HOSPITAL | Age: 56
End: 2021-03-30

## 2021-03-30 ENCOUNTER — APPOINTMENT (OUTPATIENT)
Dept: LAB | Facility: MEDICAL CENTER | Age: 56
End: 2021-03-30
Payer: MEDICARE

## 2021-03-30 DIAGNOSIS — E78.5 HYPERLIPIDEMIA, UNSPECIFIED HYPERLIPIDEMIA TYPE: Primary | ICD-10-CM

## 2021-03-30 DIAGNOSIS — E06.3 HASHIMOTO'S THYROIDITIS: ICD-10-CM

## 2021-03-30 LAB
ALBUMIN SERPL BCP-MCNC: 4 G/DL (ref 3.5–5)
ALP SERPL-CCNC: 75 U/L (ref 46–116)
ALT SERPL W P-5'-P-CCNC: 40 U/L (ref 12–78)
ANION GAP SERPL CALCULATED.3IONS-SCNC: 5 MMOL/L (ref 4–13)
AST SERPL W P-5'-P-CCNC: 22 U/L (ref 5–45)
BASOPHILS # BLD AUTO: 0.04 THOUSANDS/ΜL (ref 0–0.1)
BASOPHILS NFR BLD AUTO: 1 % (ref 0–1)
BILIRUB SERPL-MCNC: 0.18 MG/DL (ref 0.2–1)
BUN SERPL-MCNC: 16 MG/DL (ref 5–25)
CALCIUM SERPL-MCNC: 9.7 MG/DL (ref 8.3–10.1)
CHLORIDE SERPL-SCNC: 111 MMOL/L (ref 100–108)
CHOLEST SERPL-MCNC: 251 MG/DL (ref 50–200)
CO2 SERPL-SCNC: 27 MMOL/L (ref 21–32)
CREAT SERPL-MCNC: 1.02 MG/DL (ref 0.6–1.3)
EOSINOPHIL # BLD AUTO: 0.17 THOUSAND/ΜL (ref 0–0.61)
EOSINOPHIL NFR BLD AUTO: 3 % (ref 0–6)
ERYTHROCYTE [DISTWIDTH] IN BLOOD BY AUTOMATED COUNT: 11.7 % (ref 11.6–15.1)
GFR SERPL CREATININE-BSD FRML MDRD: 62 ML/MIN/1.73SQ M
GLUCOSE P FAST SERPL-MCNC: 95 MG/DL (ref 65–99)
HCT VFR BLD AUTO: 44 % (ref 34.8–46.1)
HDLC SERPL-MCNC: 64 MG/DL
HGB BLD-MCNC: 14.1 G/DL (ref 11.5–15.4)
IMM GRANULOCYTES # BLD AUTO: 0.02 THOUSAND/UL (ref 0–0.2)
IMM GRANULOCYTES NFR BLD AUTO: 0 % (ref 0–2)
LDLC SERPL CALC-MCNC: 144 MG/DL (ref 0–100)
LDLC SERPL DIRECT ASSAY-MCNC: 138 MG/DL (ref 0–100)
LYMPHOCYTES # BLD AUTO: 1.37 THOUSANDS/ΜL (ref 0.6–4.47)
LYMPHOCYTES NFR BLD AUTO: 21 % (ref 14–44)
MCH RBC QN AUTO: 30.9 PG (ref 26.8–34.3)
MCHC RBC AUTO-ENTMCNC: 32 G/DL (ref 31.4–37.4)
MCV RBC AUTO: 96 FL (ref 82–98)
MONOCYTES # BLD AUTO: 0.49 THOUSAND/ΜL (ref 0.17–1.22)
MONOCYTES NFR BLD AUTO: 7 % (ref 4–12)
NEUTROPHILS # BLD AUTO: 4.56 THOUSANDS/ΜL (ref 1.85–7.62)
NEUTS SEG NFR BLD AUTO: 68 % (ref 43–75)
NONHDLC SERPL-MCNC: 187 MG/DL
NRBC BLD AUTO-RTO: 0 /100 WBCS
PLATELET # BLD AUTO: 182 THOUSANDS/UL (ref 149–390)
PMV BLD AUTO: 10.7 FL (ref 8.9–12.7)
POTASSIUM SERPL-SCNC: 4.2 MMOL/L (ref 3.5–5.3)
PROT SERPL-MCNC: 7.9 G/DL (ref 6.4–8.2)
RBC # BLD AUTO: 4.57 MILLION/UL (ref 3.81–5.12)
SODIUM SERPL-SCNC: 143 MMOL/L (ref 136–145)
T4 FREE SERPL-MCNC: 0.8 NG/DL (ref 0.76–1.46)
TRIGL SERPL-MCNC: 215 MG/DL
TSH SERPL DL<=0.05 MIU/L-ACNC: 0.69 UIU/ML (ref 0.36–3.74)
WBC # BLD AUTO: 6.65 THOUSAND/UL (ref 4.31–10.16)

## 2021-03-30 PROCEDURE — 80053 COMPREHEN METABOLIC PANEL: CPT | Performed by: FAMILY MEDICINE

## 2021-03-30 PROCEDURE — 80061 LIPID PANEL: CPT | Performed by: FAMILY MEDICINE

## 2021-03-30 PROCEDURE — 84439 ASSAY OF FREE THYROXINE: CPT

## 2021-03-30 PROCEDURE — 84443 ASSAY THYROID STIM HORMONE: CPT

## 2021-03-30 PROCEDURE — 36415 COLL VENOUS BLD VENIPUNCTURE: CPT | Performed by: FAMILY MEDICINE

## 2021-03-30 PROCEDURE — 83721 ASSAY OF BLOOD LIPOPROTEIN: CPT | Performed by: FAMILY MEDICINE

## 2021-03-30 PROCEDURE — 85025 COMPLETE CBC W/AUTO DIFF WBC: CPT | Performed by: FAMILY MEDICINE

## 2021-03-31 ENCOUNTER — OFFICE VISIT (OUTPATIENT)
Dept: ENDOCRINOLOGY | Facility: CLINIC | Age: 56
End: 2021-03-31
Payer: MEDICARE

## 2021-03-31 VITALS
WEIGHT: 188.6 LBS | SYSTOLIC BLOOD PRESSURE: 102 MMHG | DIASTOLIC BLOOD PRESSURE: 70 MMHG | HEART RATE: 56 BPM | HEIGHT: 66 IN | BODY MASS INDEX: 30.31 KG/M2

## 2021-03-31 DIAGNOSIS — E06.3 HASHIMOTO'S THYROIDITIS: Primary | ICD-10-CM

## 2021-03-31 PROCEDURE — 99213 OFFICE O/P EST LOW 20 MIN: CPT | Performed by: PHYSICIAN ASSISTANT

## 2021-03-31 RX ORDER — CETIRIZINE HYDROCHLORIDE 10 MG/1
10 TABLET ORAL DAILY
COMMUNITY

## 2021-03-31 RX ORDER — ALPRAZOLAM 0.25 MG/1
0.25 TABLET ORAL DAILY PRN
COMMUNITY
Start: 2021-02-03

## 2021-03-31 NOTE — PROGRESS NOTES
Established Patient Progress Note       Chief Complaint   Patient presents with    Thyroid Problem        History of Present Illness: Celine Bender is a 63 y o  female with a history of thyroid dysfunction      She has had + antibody testing associated with both graves disease and Hashimotos thyroiditis  Ultrasound 10/19/2018--no nodules, mildly heterogenous and hypervascular gland  Thyroid lab testing normal yesterday and has been normal since 2019  Currently she is feeling well with no symptoms of hypo/hyperthyroidism           Patient Active Problem List   Diagnosis    Allergic rhinitis    Ambulatory dysfunction    Anxiety state    Aphasia with TBI (traumatic brain injury), closed    Cervical disc disease    Chronic post-traumatic headache    Concussion    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 [address] [facility]    Menopausal and postmenopausal disorder    Neck pain    Neck sprain and strain    Palpitations    Post concussion syndrome    Post-concussion headache    Speech abnormality    Stress incontinence in female    Traumatic brain injury (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)     Depression     Hyperglycemia     Hyperlipidemia     Lyme disease 2016    Palpitations     Post concussion syndrome       Past Surgical History:   Procedure Laterality Date    BACK SURGERY      lamenectomy l4 and l5     SECTION      ENDOMETRIAL ABLATION      SC COLONOSCOPY FLX DX W/COLLJ SPEC WHEN PFRMD N/A 2016    Procedure: COLONOSCOPY;  Surgeon: Salome Miner MD;  Location: [facility] GI LAB;   Service: Gastroenterology    SC COLONOSCOPY FLX DX W/COLLJ SPEC WHEN PFRMD N/A 8/16/2016    Procedure: COLONOSCOPY;  Surgeon: Nubia Hernandez MD;  Location: [facility] GI LAB; Service: Gastroenterology      Family History   Problem Relation Age of Onset    Hypertension Mother     Stroke Mother         vertebral artery    Coronary artery disease Father     Diabetes Father      Social History     Tobacco Use    Smoking status: Never Smoker    Smokeless tobacco: Never Used    Tobacco comment: stopped yrs ago / former smoker per [employer]   Substance Use Topics    Alcohol use: Yes     Frequency: Monthly or less     Drinks per session: 1 or 2     Binge frequency: Never     Comment: socially     Allergies   Allergen Reactions    Keflex [Cephalexin] Hives     Other reaction(s): Uticaria/Hives    Paroxetine Drowsiness       Current Outpatient Medications:     ALPRAZolam (XANAX) 0 25 mg tablet, Take 0 25 mg by mouth daily as needed for anxiety, Disp: , Rfl:     b complex vitamins tablet, Take 1 tablet by mouth daily  , Disp: , Rfl:     cetirizine (ZyrTEC) 10 mg tablet, Take 10 mg by mouth daily, Disp: , Rfl:     Cholecalciferol (VITAMIN D) 2000 units CAPS, Take 1 capsule by mouth daily, Disp: , Rfl:     Coenzyme Q10 (CO Q10) 100 MG CAPS, Take 1 capsule by mouth daily , Disp: , Rfl:     escitalopram (LEXAPRO) 20 mg tablet, Take 20 mg by mouth daily  , Disp: , Rfl:     fluticasone (VERAMYST) 27 5 MCG/SPRAY nasal spray, 2 sprays into each nostril daily as needed for allergies , Disp: , Rfl:     glucosamine-chondroitin 500-400 MG tablet, Take 1 tablet by mouth 2 (two) times a day , Disp: , Rfl:     montelukast (SINGULAIR) 10 mg tablet, Take 10 mg by mouth daily at bedtime  , Disp: , Rfl:     multivitamin (THERAGRAN) TABS, Take 1 tablet by mouth daily  , Disp: , Rfl:     Omega-3 Fatty Acids (FISH OIL PO), Take 1 capsule by mouth daily , Disp: , Rfl:     pravastatin (PRAVACHOL) 80 mg tablet, Take 1 tablet (80 mg total) by mouth daily, Disp: 90 tablet, Rfl: 2   QUEtiapine (SEROQUEL) 50 mg tablet, Take 50 mg by mouth daily as needed , Disp: , Rfl:     SUMAtriptan Succinate 6 MG/0 5ML SOAJ, Inject under the skin every 12 (twelve) hours as needed  , Disp: , Rfl:     Review of Systems   Constitutional: Negative for activity change, appetite change, chills, diaphoresis, fatigue, fever and unexpected weight change  HENT: Negative for trouble swallowing and voice change  Eyes: Negative for visual disturbance  Respiratory: Negative for shortness of breath  Cardiovascular: Negative for chest pain and palpitations  Gastrointestinal: Negative for abdominal pain, constipation and diarrhea  Endocrine: Negative for cold intolerance, heat intolerance, polydipsia, polyphagia and polyuria  Genitourinary: Negative for frequency and menstrual problem  Musculoskeletal: Negative for arthralgias and myalgias  Skin: Negative for rash  Allergic/Immunologic: Negative for food allergies  Neurological: Negative for dizziness and tremors  Hematological: Negative for adenopathy  Psychiatric/Behavioral: Negative for sleep disturbance  All other systems reviewed and are negative  Physical Exam:  Body mass index is 30 44 kg/m²  /70 (BP Location: Left arm, Patient Position: Sitting, Cuff Size: Standard)   Pulse 56   Ht 5' 6" (1 676 m)   Wt 85 5 kg (188 lb 9 6 oz)   BMI 30 44 kg/m²    Wt Readings from Last 3 Encounters:   03/31/21 85 5 kg (188 lb 9 6 oz)   09/23/19 84 2 kg (185 lb 9 6 oz)   03/14/19 84 6 kg (186 lb 6 4 oz)       Physical Exam  Vitals signs reviewed  Constitutional:       General: She is not in acute distress  Appearance: She is well-developed  HENT:      Head: Normocephalic and atraumatic  Eyes:      Conjunctiva/sclera: Conjunctivae normal       Pupils: Pupils are equal, round, and reactive to light  Neck:      Musculoskeletal: Normal range of motion and neck supple  Thyroid: No thyromegaly     Cardiovascular:      Rate and Rhythm: Normal rate and regular rhythm  Heart sounds: Normal heart sounds  Pulmonary:      Effort: Pulmonary effort is normal  No respiratory distress  Breath sounds: Normal breath sounds  No wheezing or rales  Abdominal:      General: Bowel sounds are normal  There is no distension  Palpations: Abdomen is soft  Tenderness: There is no abdominal tenderness  Musculoskeletal: Normal range of motion  Skin:     General: Skin is warm and dry  Neurological:      Mental Status: She is alert and oriented to person, place, and time  Labs:     Component      Latest Ref Rng & Units 6/24/2019 6/24/2019 9/16/2019 9/16/2019           9:14 AM  9:14 AM  8:38 AM  8:38 AM   Free T4      0 76 - 1 46 ng/dL 0 76  0 85    TSH 3RD GENERATON      0 358 - 3 740 uIU/mL  1 140  0 691     Component      Latest Ref Rng & Units 1/9/2020 1/9/2020 3/30/2021 3/30/2021          10:53 AM 10:53 AM 10:30 AM 10:30 AM   Free T4      0 76 - 1 46 ng/dL  0 76 0 80    TSH 3RD GENERATON      0 358 - 3 740 uIU/mL 0 638   0 691       Impression & Plan:    Problem List Items Addressed This Visit        Endocrine    Hashimoto's thyroiditis - Primary     TSH remains normal since 6/2019  She has had + antibodies for Graves and Hashimoto's  She will plan to follow up with her primary care physician for monitoring of thyroid function on a yearly basis or sooner if she develops symptoms of hypo/hyperthyroidism  No orders of the defined types were placed in this encounter  There are no Patient Instructions on file for this visit  Discussed with the patient and all questioned fully answered  She will call me if any problems arise  Follow-up appointment if needed       Counseled patient on diagnostic results, prognosis, risk and benefit of treatment options, instruction for management, importance of treatment compliance, Risk  factor reduction and impressions      Aleshia Ferrer PA-C

## 2021-03-31 NOTE — ASSESSMENT & PLAN NOTE
TSH remains normal since 6/2019  She has had + antibodies for Graves and Hashimoto's  She will plan to follow up with her primary care physician for monitoring of thyroid function on a yearly basis or sooner if she develops symptoms of hypo/hyperthyroidism

## 2021-06-09 ENCOUNTER — OFFICE VISIT (OUTPATIENT)
Dept: URGENT CARE | Facility: MEDICAL CENTER | Age: 56
End: 2021-06-09
Payer: MEDICARE

## 2021-06-09 VITALS
OXYGEN SATURATION: 94 % | HEIGHT: 64 IN | HEART RATE: 92 BPM | BODY MASS INDEX: 31.58 KG/M2 | TEMPERATURE: 101 F | WEIGHT: 185 LBS | RESPIRATION RATE: 18 BRPM

## 2021-06-09 DIAGNOSIS — R52 BODY ACHES: Primary | ICD-10-CM

## 2021-06-09 PROCEDURE — U0003 INFECTIOUS AGENT DETECTION BY NUCLEIC ACID (DNA OR RNA); SEVERE ACUTE RESPIRATORY SYNDROME CORONAVIRUS 2 (SARS-COV-2) (CORONAVIRUS DISEASE [COVID-19]), AMPLIFIED PROBE TECHNIQUE, MAKING USE OF HIGH THROUGHPUT TECHNOLOGIES AS DESCRIBED BY CMS-2020-01-R: HCPCS | Performed by: PHYSICIAN ASSISTANT

## 2021-06-09 PROCEDURE — G0463 HOSPITAL OUTPT CLINIC VISIT: HCPCS | Performed by: PHYSICIAN ASSISTANT

## 2021-06-09 PROCEDURE — U0005 INFEC AGEN DETEC AMPLI PROBE: HCPCS | Performed by: PHYSICIAN ASSISTANT

## 2021-06-09 PROCEDURE — 99213 OFFICE O/P EST LOW 20 MIN: CPT | Performed by: PHYSICIAN ASSISTANT

## 2021-06-09 NOTE — PROGRESS NOTES
[facility]        NAME: Heike Mullins is a 63 y o  female  : 1965    MRN: [id number]  DATE: 2021  TIME: 5:54 PM    Assessment and Plan   Body aches [R52]  1  Body aches  Novel Coronavirus (Covid-19),PCR [facility] Collection         Patient Instructions     Body aches/ fever  covid test sent  Follow up with PCP in 3-5 days  Proceed to  ER if symptoms worsen  Chief Complaint     Chief Complaint   Patient presents with    Generalized Body Aches     Started yesterday with some body aches and chills    Fever     Highest recorded 100 3F    Chills    Headache         History of Present Illness       62 y/o female presents c/o generalized body aches, fever, headache x 1 day  Patient states she was covid positive in December  Denies chest pain, SOB, nausea, vomiting  Has only taken tylenol      Review of Systems   Review of Systems   Constitutional: Positive for chills and fever  Negative for activity change, appetite change, diaphoresis and fatigue  HENT: Positive for congestion and rhinorrhea  Negative for ear discharge, ear pain, facial swelling, hearing loss, mouth sores, nosebleeds, postnasal drip, sinus pressure, sinus pain, sneezing, sore throat and voice change  Respiratory: Negative for apnea, cough, choking, chest tightness, shortness of breath, wheezing and stridor  Cardiovascular: Negative  Current Medications       Current Outpatient Medications:     ALPRAZolam (XANAX) 0 25 mg tablet, Take 0 25 mg by mouth daily as needed for anxiety, Disp: , Rfl:     b complex vitamins tablet, Take 1 tablet by mouth daily  , Disp: , Rfl:     cetirizine (ZyrTEC) 10 mg tablet, Take 10 mg by mouth daily, Disp: , Rfl:     Cholecalciferol (VITAMIN D) 2000 units CAPS, Take 1 capsule by mouth daily, Disp: , Rfl:     Coenzyme Q10 (CO Q10) 100 MG CAPS, Take 1 capsule by mouth daily , Disp: , Rfl:     escitalopram (LEXAPRO) 20 mg tablet, Take 20 mg by mouth daily  , Disp: , Rfl:     fluticasone (VERAMYST) 27 5 MCG/SPRAY nasal spray, 2 sprays into each nostril daily as needed for allergies , Disp: , Rfl:     glucosamine-chondroitin 500-400 MG tablet, Take 1 tablet by mouth 2 (two) times a day , Disp: , Rfl:     montelukast (SINGULAIR) 10 mg tablet, Take 10 mg by mouth daily at bedtime  , Disp: , Rfl:     multivitamin (THERAGRAN) TABS, Take 1 tablet by mouth daily  , Disp: , Rfl:     Omega-3 Fatty Acids (FISH OIL PO), Take 1 capsule by mouth daily , Disp: , Rfl:     pravastatin (PRAVACHOL) 80 mg tablet, Take 1 tablet (80 mg total) by mouth daily, Disp: 90 tablet, Rfl: 2    QUEtiapine (SEROQUEL) 50 mg tablet, Take 50 mg by mouth daily as needed , Disp: , Rfl:     SUMAtriptan Succinate 6 MG/0 5ML SOAJ, Inject under the skin every 12 (twelve) hours as needed  , Disp: , Rfl:     Current Allergies     Allergies as of 2021 - Reviewed 2021   Allergen Reaction Noted    Keflex [cephalexin] Hives 2015    Paroxetine Drowsiness 2015    Adhesive [medical tape] Rash 2021            The following portions of the patient's history were reviewed and updated as appropriate: allergies, current medications, past family history, past medical history, past social history, past surgical history and problem list      Past Medical History:   Diagnosis Date    Anxiety     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)     Depression     Hyperglycemia     Hyperlipidemia     Lyme disease 2016    Palpitations     Post concussion syndrome        Past Surgical History:   Procedure Laterality Date    BACK SURGERY      lamenectomy l4 and l5     SECTION      ENDOMETRIAL ABLATION      MO COLONOSCOPY FLX DX W/COLLJ SPEC WHEN PFRMD N/A 2016    Procedure: COLONOSCOPY;  Surgeon: Jolie Grady MD;  Location: [facility] GI LAB;   Service: Gastroenterology    MO COLONOSCOPY FLX DX W/COLLJ [facility] WHEN PFRMD N/A 2016    Procedure: COLONOSCOPY;  Surgeon: Stephenie Almanzar MD;  Location: [facility] GI LAB; Service: Gastroenterology       Family History   Problem Relation Age of Onset    Hypertension Mother     Stroke Mother         vertebral artery    Coronary artery disease Father     Diabetes Father          Medications have been verified  Objective   Ht 5' 4" (1 626 m)   Wt 83 9 kg (185 lb)   BMI 31 76 kg/m²        Physical Exam     Physical Exam  Constitutional:       General: She is not in acute distress  Appearance: She is well-developed  She is not diaphoretic  HENT:      Head: Normocephalic and atraumatic  Right Ear: Hearing, tympanic membrane, ear canal and external ear normal       Left Ear: Hearing, tympanic membrane, ear canal and external ear normal       Nose: Rhinorrhea present  Mouth/Throat:      Pharynx: Uvula midline  Neck:      Musculoskeletal: Normal range of motion and neck supple  Cardiovascular:      Rate and Rhythm: Normal rate and regular rhythm  Heart sounds: Normal heart sounds  Pulmonary:      Effort: Pulmonary effort is normal       Breath sounds: Normal breath sounds  Lymphadenopathy:      Cervical: Cervical adenopathy present

## 2021-06-09 NOTE — PATIENT INSTRUCTIONS
Body aches/ fever  covid test sent  Follow up with PCP in 3-5 days  Proceed to  ER if symptoms worsen  [address]    Your healthcare provider and/or public health staff have evaluated you and have determined that you do not need to remain in the hospital at this time  At this time you can be isolated at home where you will be monitored by staff from your local or state health department  You should carefully follow the prevention and isolation steps below until a healthcare provider or local or state health department says that you can return to your normal activities  Stay home except to get medical care    People who are mildly ill with COVID-19 are able to isolate at home during their illness  You should restrict activities outside your home, except for getting medical care  Do not go to work, school, or public areas  Avoid using public transportation, ride-sharing, or taxis  Separate yourself from other people and animals in your home    People: As much as possible, you should stay in a specific room and away from other people in your home  Also, you should use a separate bathroom, if available  Animals: You should restrict contact with pets and other animals while you are sick with COVID-19, just like you would around other people  Although there have not been reports of pets or other animals becoming sick with COVID-19, it is still recommended that people sick with COVID-19 limit contact with animals until more information is known about the virus  When possible, have another member of your household care for your animals while you are sick  If you are sick with COVID-19, avoid contact with your pet, including petting, snuggling, being kissed or licked, and sharing food  If you must care for your pet or be around animals while you are sick, wash your hands before and after you interact with pets and wear a facemask  See COVID-19 and Animals for more information      Call ahead before visiting your doctor    If you have a medical appointment, call the healthcare provider and tell them that you have or may have COVID-19  This will help the healthcare providers office take steps to keep other people from getting infected or exposed  Wear a facemask    You should wear a facemask when you are around other people (e g , sharing a room or vehicle) or pets and before you enter a healthcare providers office  If you are not able to wear a facemask (for example, because it causes trouble breathing), then people who live with you should not stay in the same room with you, or they should wear a facemask if they enter your room  Cover your coughs and sneezes    Cover your mouth and nose with a tissue when you cough or sneeze  Throw used tissues in a lined trash can  Immediately wash your hands with soap and water for at least 20 seconds or, if soap and water are not available, clean your hands with an alcohol-based hand  that contains at least 60% alcohol  Clean your hands often    Wash your hands often with soap and water for at least 20 seconds, especially after blowing your nose, coughing, or sneezing; going to the bathroom; and before eating or preparing food  If soap and water are not readily available, use an alcohol-based hand  with at least 60% alcohol, covering all surfaces of your hands and rubbing them together until they feel dry  Soap and water are the best option if hands are visibly dirty  Avoid touching your eyes, nose, and mouth with unwashed hands  Avoid sharing personal household items    You should not share dishes, drinking glasses, cups, eating utensils, towels, or bedding with other people or pets in your home  After using these items, they should be washed thoroughly with soap and water      Clean all high-touch surfaces everyday    High touch surfaces include counters, tabletops, doorknobs, bathroom fixtures, toilets, phones, keyboards, tablets, and bedside tables  Also, clean any surfaces that may have blood, stool, or body fluids on them  Use a household cleaning spray or wipe, according to the label instructions  Labels contain instructions for safe and effective use of the cleaning product including precautions you should take when applying the product, such as wearing gloves and making sure you have good ventilation during use of the product  Monitor your symptoms    Seek prompt medical attention if your illness is worsening (e g , difficulty breathing)  Before seeking care, call your healthcare provider and tell them that you have, or are being evaluated for, COVID-19  Put on a facemask before you enter the facility  These steps will help the healthcare providers office to keep other people in the office or waiting room from getting infected or exposed  Ask your healthcare provider to call the local or [facility] health department  Persons who are placed under active monitoring or facilitated self-monitoring should follow instructions provided by their local health department or occupational health professionals, as appropriate  If you have a medical emergency and need to call 911, notify the dispatch personnel that you have, or are being evaluated for COVID-19  If possible, put on a facemask before emergency medical services arrive      Discontinuing home isolation    Patients with confirmed COVID-19 should remain under home isolation precautions until the following conditions are met:   - They have had no fever for at least 24 hours (that is one full day of no fever without the use medicine that reduces fevers)  AND  - other symptoms have improved (for example, when their cough or shortness of breath have improved)  AND  - If had mild or moderate illness, at least 10 days have passed since their symptoms first appeared or if severe illness (needed oxygen) or immunosuppressed, at least 20 days have passed since symptoms first appeared  Patients with confirmed COVID-19 should also notify close contacts (including their workplace) and ask that they self-quarantine  Currently, close contact is defined as being within 6 feet for 15 minutes or more from the period 24 hours starting 48 hours before symptom onset to the time at which the patient went into isolation  Close contacts of patients diagnosed with COVID-19 should be instructed by the patient to self-quarantine for 14 days from the last time of their last contact with the patient       Source: [employer]

## 2021-06-10 LAB — SARS-COV-2 RNA RESP QL NAA+PROBE: NEGATIVE

## 2022-07-12 ENCOUNTER — APPOINTMENT (OUTPATIENT)
Dept: LAB | Facility: MEDICAL CENTER | Age: 57
End: 2022-07-12
Payer: MEDICARE

## 2022-07-26 ENCOUNTER — APPOINTMENT (OUTPATIENT)
Dept: LAB | Facility: MEDICAL CENTER | Age: 57
End: 2022-07-26
Payer: MEDICARE

## 2022-07-26 DIAGNOSIS — R00.2 PALPITATIONS: ICD-10-CM

## 2022-07-26 LAB
T4 FREE SERPL-MCNC: 0.88 NG/DL (ref 0.76–1.46)
TSH SERPL DL<=0.05 MIU/L-ACNC: 1.21 UIU/ML (ref 0.45–4.5)

## 2022-07-26 PROCEDURE — 84443 ASSAY THYROID STIM HORMONE: CPT

## 2022-07-26 PROCEDURE — 84439 ASSAY OF FREE THYROXINE: CPT

## 2022-07-26 PROCEDURE — 36415 COLL VENOUS BLD VENIPUNCTURE: CPT

## 2023-02-01 ENCOUNTER — APPOINTMENT (OUTPATIENT)
Dept: LAB | Facility: MEDICAL CENTER | Age: 58
End: 2023-02-01

## 2023-02-01 DIAGNOSIS — E78.2 MIXED HYPERLIPIDEMIA: ICD-10-CM

## 2023-02-01 LAB
CHOLEST SERPL-MCNC: 245 MG/DL
HDLC SERPL-MCNC: 69 MG/DL
LDLC SERPL CALC-MCNC: 149 MG/DL (ref 0–100)
NONHDLC SERPL-MCNC: 176 MG/DL
TRIGL SERPL-MCNC: 137 MG/DL

## 2023-08-08 ENCOUNTER — APPOINTMENT (OUTPATIENT)
Dept: LAB | Facility: MEDICAL CENTER | Age: 58
End: 2023-08-08
Payer: MEDICARE

## 2023-08-08 DIAGNOSIS — E78.2 MIXED HYPERLIPIDEMIA: ICD-10-CM

## 2023-08-08 PROCEDURE — 80061 LIPID PANEL: CPT

## 2023-08-08 PROCEDURE — 36415 COLL VENOUS BLD VENIPUNCTURE: CPT

## 2023-08-09 LAB
CHOLEST SERPL-MCNC: 206 MG/DL
HDLC SERPL-MCNC: 74 MG/DL
LDLC SERPL CALC-MCNC: 98 MG/DL (ref 0–100)
NONHDLC SERPL-MCNC: 132 MG/DL
TRIGL SERPL-MCNC: 169 MG/DL

## 2024-02-12 ENCOUNTER — APPOINTMENT (OUTPATIENT)
Dept: LAB | Facility: MEDICAL CENTER | Age: 59
End: 2024-02-12
Payer: MEDICARE

## 2024-02-21 PROBLEM — Z00.00 HEALTH CARE MAINTENANCE: Status: RESOLVED | Noted: 2018-10-18 | Resolved: 2024-02-21

## 2024-09-10 ENCOUNTER — APPOINTMENT (OUTPATIENT)
Dept: LAB | Facility: MEDICAL CENTER | Age: 59
End: 2024-09-10
Payer: MEDICARE

## 2024-09-10 DIAGNOSIS — E78.2 MIXED HYPERLIPIDEMIA: ICD-10-CM

## 2024-09-10 LAB
CHOLEST SERPL-MCNC: 227 MG/DL
HDLC SERPL-MCNC: 70 MG/DL
LDLC SERPL CALC-MCNC: 132 MG/DL (ref 0–100)
NONHDLC SERPL-MCNC: 157 MG/DL
TRIGL SERPL-MCNC: 125 MG/DL

## 2024-09-10 PROCEDURE — 36415 COLL VENOUS BLD VENIPUNCTURE: CPT

## 2024-09-10 PROCEDURE — 80061 LIPID PANEL: CPT

## 2024-12-12 ENCOUNTER — OFFICE VISIT (OUTPATIENT)
Dept: URGENT CARE | Facility: MEDICAL CENTER | Age: 59
End: 2024-12-12
Payer: MEDICARE

## 2024-12-12 VITALS
TEMPERATURE: 97.5 F | RESPIRATION RATE: 18 BRPM | WEIGHT: 179 LBS | DIASTOLIC BLOOD PRESSURE: 76 MMHG | OXYGEN SATURATION: 96 % | HEART RATE: 68 BPM | BODY MASS INDEX: 30.73 KG/M2 | SYSTOLIC BLOOD PRESSURE: 140 MMHG

## 2024-12-12 DIAGNOSIS — B02.9 HERPES ZOSTER WITHOUT COMPLICATION: Primary | ICD-10-CM

## 2024-12-12 PROCEDURE — G0463 HOSPITAL OUTPT CLINIC VISIT: HCPCS | Performed by: PHYSICIAN ASSISTANT

## 2024-12-12 PROCEDURE — 99213 OFFICE O/P EST LOW 20 MIN: CPT | Performed by: PHYSICIAN ASSISTANT

## 2024-12-12 RX ORDER — MELOXICAM 15 MG/1
TABLET ORAL
COMMUNITY
Start: 2024-07-23

## 2024-12-12 RX ORDER — BUPROPION HYDROCHLORIDE 150 MG/1
TABLET ORAL
COMMUNITY
Start: 2024-12-10

## 2024-12-12 NOTE — PROGRESS NOTES
"  [facility]        NAME: Sunni Pires is a 59 y.o. female  : 1965    MRN: [id number]  DATE: 2024  TIME: 8:49 AM    Assessment and Plan   Herpes zoster without complication [B02.9]  1. Herpes zoster without complication              Patient Instructions     Keep skin clean and dry  Watch for S&S of secondary infection (redness, swelling, drainage, fever)  Follow-up with PCP if symptoms worsen or persist  Consider Shingrix vaccine      If tests have been performed at [facility], our office will contact you with results if changes need to be made to the care plan discussed with you at the visit.  You can review your full results on [facility].    Chief Complaint     Chief Complaint   Patient presents with    Herpes Zoster     Pt. With a painful rash to her left side that began a week ago.          History of Present Illness       Sunni is a 59-year-old female who presents with a rash that developed on her back and chest over the past week.  Patient reports she had \"burning and stinging\" prior to the eruption of the rash.  She denies any systemic symptoms including fever, chills or bodyaches.  Patient has no prior history of herpes zoster.        Review of Systems   Review of Systems   Constitutional: Negative.    HENT: Negative.     Respiratory: Negative.     Cardiovascular: Negative.    Gastrointestinal: Negative.    Skin:  Positive for rash.         Current Medications       Current Outpatient Medications:     b complex vitamins tablet, Take 1 tablet by mouth daily., Disp: , Rfl:     buPROPion (WELLBUTRIN XL) 150 mg 24 hr tablet, Take by mouth, Disp: , Rfl:     cetirizine (ZyrTEC) 10 mg tablet, Take 10 mg by mouth daily, Disp: , Rfl:     Coenzyme Q10 (CO Q10) 100 MG CAPS, Take 1 capsule by mouth daily , Disp: , Rfl:     escitalopram (LEXAPRO) 20 mg tablet, Take 20 mg by mouth daily., Disp: , Rfl:     fluticasone (VERAMYST) 27.5 MCG/SPRAY nasal spray, 2 sprays into each nostril " daily as needed for allergies , Disp: , Rfl:     glucosamine-chondroitin 500-400 MG tablet, Take 1 tablet by mouth 2 (two) times a day , Disp: , Rfl:     meloxicam (MOBIC) 15 mg tablet, Take by mouth, Disp: , Rfl:     montelukast (SINGULAIR) 10 mg tablet, Take 10 mg by mouth daily at bedtime., Disp: , Rfl:     multivitamin (THERAGRAN) TABS, Take 1 tablet by mouth daily., Disp: , Rfl:     Omega-3 Fatty Acids (FISH OIL PO), Take 1 capsule by mouth daily , Disp: , Rfl:     pravastatin (PRAVACHOL) 80 mg tablet, Take 1 tablet (80 mg total) by mouth daily, Disp: 90 tablet, Rfl: 2    QUEtiapine (SEROQUEL) 50 mg tablet, Take 50 mg by mouth daily as needed , Disp: , Rfl:     SUMAtriptan Succinate 6 MG/0.5ML SOAJ, Inject under the skin every 12 (twelve) hours as needed., Disp: , Rfl:     ALPRAZolam (XANAX) 0.25 mg tablet, Take 0.25 mg by mouth daily as needed for anxiety, Disp: , Rfl:     Cholecalciferol (VITAMIN D) 2000 units CAPS, Take 1 capsule by mouth daily (Patient not taking: Reported on 2024), Disp: , Rfl:     Current Allergies     Allergies as of 2024 - Reviewed 2024   Allergen Reaction Noted    Keflex [cephalexin] Hives 2015    Paroxetine Drowsiness 2015    Adhesive [medical tape] Rash 2021            The following portions of the patient's history were reviewed and updated as appropriate: allergies, current medications, past family history, past medical history, past social history, past surgical history and problem list.     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)     Depression     Hyperglycemia     Hyperlipidemia     Lyme disease 2016    Palpitations     Post concussion syndrome        Past Surgical History:   Procedure Laterality Date    BACK SURGERY      lamenectomy l4 and l5     SECTION      ENDOMETRIAL ABLATION      [place] COLONOSCOPY FLX DX W/COLLJ SPEC WHEN PFRMD N/A 2016    Procedure:  COLONOSCOPY;  Surgeon: Otto Naranjo MD;  Location: [facility];  Service: Gastroenterology    [place] COLONOSCOPY FLX DX W/COLLJ SPEC WHEN PFRMD N/A 8/16/2016    Procedure: COLONOSCOPY;  Surgeon: Otto Naranjo MD;  Location: [facility];  Service: Gastroenterology       Family History   Problem Relation Age of Onset    Hypertension Mother     Stroke Mother         vertebral artery    Coronary artery disease Father     Diabetes Father          Medications have been verified.        Objective   /76   Pulse 68   Temp 97.5 °F (36.4 °C)   Resp 18   Wt 81.2 kg (179 lb)   SpO2 96%   BMI 30.73 kg/m²   No LMP recorded.       Physical Exam     Physical Exam  Constitutional:       General: She is not in acute distress.     Appearance: Normal appearance. She is not ill-appearing.   HENT:      Head: Normocephalic and atraumatic.      Right Ear: Tympanic membrane and ear canal normal.      Left Ear: Tympanic membrane and ear canal normal.      Nose: Nose normal.      Mouth/Throat:      Lips: Pink.      Pharynx: Oropharynx is clear.   Cardiovascular:      Rate and Rhythm: Normal rate and regular rhythm.      Heart sounds: Normal heart sounds, S1 normal and S2 normal. No murmur heard.  Pulmonary:      Effort: Pulmonary effort is normal.      Breath sounds: Normal breath sounds and air entry.   Skin:     Comments: Erythematous patches noted on the left flank to the abdominal region.  Rash does not cross midline, no vesicles or bulla.   Neurological:      Mental Status: She is alert.

## 2024-12-12 NOTE — PATIENT INSTRUCTIONS
Keep skin clean and dry  Watch for S&S of secondary infection (redness, swelling, drainage, fever)  Follow-up with PCP if symptoms worsen or persist  Consider Shingrix vaccine

## 2025-03-28 ENCOUNTER — APPOINTMENT (OUTPATIENT)
Dept: LAB | Facility: MEDICAL CENTER | Age: 60
End: 2025-03-28
Payer: MEDICARE

## 2025-03-28 DIAGNOSIS — R00.2 PALPITATIONS: ICD-10-CM

## 2025-03-28 DIAGNOSIS — E78.5 HYPERLIPIDEMIA, UNSPECIFIED HYPERLIPIDEMIA TYPE: ICD-10-CM

## 2025-03-28 LAB
CHOLEST SERPL-MCNC: 226 MG/DL (ref ?–200)
HDLC SERPL-MCNC: 64 MG/DL
LDLC SERPL CALC-MCNC: 129 MG/DL (ref 0–100)
NONHDLC SERPL-MCNC: 162 MG/DL
TRIGL SERPL-MCNC: 166 MG/DL (ref ?–150)
TSH SERPL DL<=0.05 MIU/L-ACNC: 0.57 UIU/ML (ref 0.45–4.5)

## 2025-03-28 PROCEDURE — 80061 LIPID PANEL: CPT

## 2025-03-28 PROCEDURE — 36415 COLL VENOUS BLD VENIPUNCTURE: CPT

## 2025-03-28 PROCEDURE — 84443 ASSAY THYROID STIM HORMONE: CPT
